# Patient Record
Sex: MALE | Race: BLACK OR AFRICAN AMERICAN | Employment: STUDENT | URBAN - METROPOLITAN AREA
[De-identification: names, ages, dates, MRNs, and addresses within clinical notes are randomized per-mention and may not be internally consistent; named-entity substitution may affect disease eponyms.]

---

## 2017-07-12 ENCOUNTER — OFFICE VISIT (OUTPATIENT)
Dept: FAMILY MEDICINE CLINIC | Age: 22
End: 2017-07-12

## 2017-07-12 VITALS
DIASTOLIC BLOOD PRESSURE: 72 MMHG | HEIGHT: 73 IN | OXYGEN SATURATION: 95 % | RESPIRATION RATE: 16 BRPM | WEIGHT: 180.8 LBS | TEMPERATURE: 97.5 F | BODY MASS INDEX: 23.96 KG/M2 | HEART RATE: 71 BPM | SYSTOLIC BLOOD PRESSURE: 128 MMHG

## 2017-07-12 DIAGNOSIS — Z02.0 SCHOOL PHYSICAL EXAM: Primary | ICD-10-CM

## 2017-07-12 DIAGNOSIS — R56.9 SEIZURE (HCC): ICD-10-CM

## 2017-07-12 LAB
BILIRUB UR QL STRIP: NEGATIVE
GLUCOSE UR-MCNC: NEGATIVE MG/DL
KETONES P FAST UR STRIP-MCNC: NEGATIVE MG/DL
PH UR STRIP: 8.5 [PH] (ref 4.6–8)
PROT UR QL STRIP: ABNORMAL MG/DL
SP GR UR STRIP: 1.01 (ref 1–1.03)
UA UROBILINOGEN AMB POC: ABNORMAL (ref 0.2–1)
URINALYSIS CLARITY POC: CLEAR
URINALYSIS COLOR POC: YELLOW
URINE BLOOD POC: NEGATIVE
URINE LEUKOCYTES POC: NEGATIVE
URINE NITRITES POC: NEGATIVE

## 2017-07-12 RX ORDER — DIVALPROEX SODIUM 500 MG/1
500 TABLET, EXTENDED RELEASE ORAL 2 TIMES DAILY
COMMUNITY
Start: 2017-06-05 | End: 2021-11-12 | Stop reason: SDUPTHER

## 2017-07-12 NOTE — MR AVS SNAPSHOT
Visit Information Date & Time Provider Department Dept. Phone Encounter #  
 7/12/2017 11:15 AM Nba Montoya MD [address] [phone] [id number] Follow-up Instructions Return in about 1 year (around 7/12/2018). Your Appointments 7/24/2017 11:45 AM  
SCHOOL/SPORTS PHYSICAL with Nba Montoya MD  
[address]) Appt Note: College Physical per Dr. Rod Kurtz [address] [facility] 7 [address] [phone] [address] Upcoming Health Maintenance Date Due  
 HPV AGE 9Y-34Y (1 of 3 - Male 3 Dose Series) 12/6/2008 INFLUENZA AGE 9 TO ADULT 8/1/2017 DTaP/Tdap/Td series (8 - Td) 7/11/2026 Allergies as of 7/12/2017  Review Complete On: 7/11/2016 By: Gareth Cleary MD  
 No Known Allergies Current Immunizations  Reviewed on 7/12/2016 Name Date DTAP Vaccine 12/28/2001, 6/28/1999, 6/12/1998, 3/20/1998, 1/20/1998 HIB Vaccine 3/5/1999, 6/12/1998, 3/20/1998, 1/20/1998 Hepatitis A Vaccine 8/21/2007, 1/15/2007 Hepatitis B Vaccine 9/25/1998, 3/22/1998, 1/20/1998 IPV 6/28/1999, 3/20/1998, 1/20/1998 MMR Vaccine 12/8/2002, 3/15/1999 Meningococcal (MCV4O) Vaccine 7/11/2016 Meningococcal Vaccine 4/21/2011, 7/24/2009 Poliovirus vaccine 12/28/2001 TDAP Vaccine 7/8/2008 Td, Adsorbed PF 7/11/2016 Varicella Virus Vaccine Live 7/24/2009, 12/28/1998 Not reviewed this visit You Were Diagnosed With   
  
 Codes Comments School physical exam    -  Primary ICD-10-CM: Z02.0 ICD-9-CM: V70.5 Vitals BP Pulse Temp Resp Height(growth percentile) 128/72 (60 %/ 27 %)* (BP 1 Location: Left arm, BP Patient Position: Sitting) 71 97.5 °F (36.4 °C) (Oral) 16 6' 1\" (1.854 m) (89 %, Z= 1.22) Weight(growth percentile) SpO2 BMI Smoking Status 180 lb 12.8 oz (82 kg) (82 %, Z= 0.91) 95% 23.85 kg/m2 (63 %, Z= 0.32) Never Smoker *BP percentiles are based on NHBPEP's 4th Report Growth percentiles are based on CDC 2-20 Years data. Vitals History BMI and BSA Data Body Mass Index Body Surface Area  
 23.85 kg/m 2 2.06 m 2 Preferred Pharmacy Pharmacy Name Phone [facility] [address], [address] [place] [address] Avenue [phone] Your Updated Medication List  
  
   
This list is accurate as of: 7/12/17 12:15 PM.  Always use your most recent med list.  
  
  
  
  
 * DEPAKOTE 500 mg tablet Generic drug:  divalproex DR Take 500 mg by mouth two (2) times a day. * divalproex  mg ER tablet Commonly known as:  DEPAKOTE ER Take 500 mg by mouth two (2) times a day. multivitamin tablet Commonly known as:  ONE A DAY Take 1 Tab by mouth daily. * Notice: This list has 2 medication(s) that are the same as other medications prescribed for you. Read the directions carefully, and ask your doctor or other care provider to review them with you. We Performed the Following AMB POC URINALYSIS DIP STICK AUTO W/ MICRO [61956 CPT(R)] CBC W/O DIFF [00443 CPT(R)] LIPID PANEL [69309 CPT(R)] Follow-up Instructions Return in about 1 year (around 7/12/2018). Introducing [facility]! Dear Andrew Isaac: Thank you for requesting a LiquidPlanner account. Our records indicate that you have previously registered for a LiquidPlanner account but its currently inactive. Please call our LiquidPlanner support line at [phone]. Additional Information If you have questions, please visit the Frequently Asked Questions section of the LiquidPlanner website at [email or web address] Remember, LiquidPlanner is NOT to be used for urgent needs. For medical emergencies, dial 911. Now available from your iPhone and Android! Please provide this summary of care documentation to your next provider. Your primary care clinician is listed as Kikr Garay. If you have any questions after today's visit, please call [phone].

## 2017-07-12 NOTE — PROGRESS NOTES
Subjective:   Tenzin Hardy is a 25 y.o. male presenting for his annual checkup. ROS:  Feeling well. No dyspnea or chest pain on exertion. No abdominal pain, change in bowel habits, black or bloody stools. No urinary tract or prostatic symptoms. No neurological complaints. Denies any recent seizure activity. Patient Active Problem List   Diagnosis Code    Seizure ([facility] R56.9       Current Outpatient Prescriptions   Medication Sig Dispense Refill    divalproex ER (DEPAKOTE ER) 500 mg ER tablet Take 500 mg by mouth two (2) times a day.  multivitamin (ONE A DAY) tablet Take 1 Tab by mouth daily. No Known Allergies    History reviewed. No pertinent past medical history. History reviewed. No pertinent surgical history. Family History   Problem Relation Age of Onset    Hypertension Maternal Grandmother     Hypertension Paternal Grandmother     Cancer Paternal Grandfather     Headache Mother     Hypertension Father     Cancer Father     No Known Problems Sister        Social History   Substance Use Topics    Smoking status: Never Smoker    Smokeless tobacco: Never Used    Alcohol use No            Objective:     Visit Vitals    /72 (BP 1 Location: Left arm, BP Patient Position: Sitting)    Pulse 71    Temp 97.5 °F (36.4 °C) (Oral)    Resp 16    Ht 6' 1\" (1.854 m)    Wt 180 lb 12.8 oz (82 kg)    SpO2 95%    BMI 23.85 kg/m2     The patient appears well, alert, oriented x 3, in no distress. ENT normal.  Neck supple. No adenopathy or thyromegaly. MANNY. Lungs are clear, good air entry, no wheezes, rhonchi or rales. S1 and S2 normal, no murmurs, regular rate and rhythm. Abdomen is soft without tenderness, guarding, mass or organomegaly.  exam: no penile lesions or discharge, no testicular masses or tenderness, no hernias. Extremities show no edema, normal peripheral pulses. Neurological is normal without focal findings.     Assessment/Plan:   healthy adult male  continue present diet with no restrictions, routine labs ordered, anticipatory guidance for age. Christa Ortiz was seen today for complete physical.    Diagnoses and all orders for this visit:    School physical exam  -     AMB POC URINALYSIS DIP STICK AUTO W/ MICRO  -     CBC W/O DIFF  -     LIPID PANEL  Form to be completed for his college. Seizure ([address]  Stable. Follow up with neuro. Follow-up Disposition:  Return in about 1 year (around 7/12/2018). reviewed diet, exercise and weight control  cardiovascular risk and specific lipid/LDL goals reviewed.

## 2017-07-13 LAB
CHOLEST SERPL-MCNC: 163 MG/DL (ref 100–169)
ERYTHROCYTE [DISTWIDTH] IN BLOOD BY AUTOMATED COUNT: 15.2 % (ref 12.3–15.4)
HCT VFR BLD AUTO: 49.2 % (ref 37.5–51)
HDLC SERPL-MCNC: 33 MG/DL
HGB BLD-MCNC: 16 G/DL (ref 12.6–17.7)
INTERPRETATION, 910389: NORMAL
LDLC SERPL CALC-MCNC: 104 MG/DL (ref 0–109)
MCH RBC QN AUTO: 26.1 PG (ref 26.6–33)
MCHC RBC AUTO-ENTMCNC: 32.5 G/DL (ref 31.5–35.7)
MCV RBC AUTO: 80 FL (ref 79–97)
PLATELET # BLD AUTO: 248 X10E3/UL (ref 150–379)
RBC # BLD AUTO: 6.13 X10E6/UL (ref 4.14–5.8)
TRIGL SERPL-MCNC: 129 MG/DL (ref 0–89)
VLDLC SERPL CALC-MCNC: 26 MG/DL (ref 5–40)
WBC # BLD AUTO: 8.1 X10E3/UL (ref 3.4–10.8)

## 2017-07-19 ENCOUNTER — TELEPHONE (OUTPATIENT)
Dept: FAMILY MEDICINE CLINIC | Age: 22
End: 2017-07-19

## 2017-07-19 NOTE — TELEPHONE ENCOUNTER
----- Message from Jasmine Mendez MD sent at 7/12/2017  1:25 PM EDT -----  Please call pt and find out when he was last seen by his neurology for his seizures. See if he has upcoming appt.

## 2017-07-19 NOTE — TELEPHONE ENCOUNTER
Spoke to pt's mother to find out when pt last saw neurologist. Pt's mother stated  pt saw his neurologist 6/29/2017 and has another appt scheduled for 12/2017. Dr. Kevin Cunningham made aware.

## 2019-02-26 ENCOUNTER — TELEPHONE (OUTPATIENT)
Dept: FAMILY MEDICINE CLINIC | Age: 24
End: 2019-02-26

## 2019-02-26 NOTE — TELEPHONE ENCOUNTER
----- Message from Tim Hammonds sent at 2/26/2019  7:34 AM EST -----  Regarding: Dr. Dominique uLcas  Pt requested an appt for a f/up for a uti and requested an appt for Friday, 03/08/19. Best contact number [phone].

## 2019-03-08 ENCOUNTER — OFFICE VISIT (OUTPATIENT)
Dept: FAMILY MEDICINE CLINIC | Age: 24
End: 2019-03-08

## 2019-03-08 VITALS
BODY MASS INDEX: 24.92 KG/M2 | TEMPERATURE: 96.7 F | DIASTOLIC BLOOD PRESSURE: 68 MMHG | OXYGEN SATURATION: 100 % | HEART RATE: 76 BPM | SYSTOLIC BLOOD PRESSURE: 135 MMHG | RESPIRATION RATE: 16 BRPM | HEIGHT: 73 IN | WEIGHT: 188 LBS

## 2019-03-08 DIAGNOSIS — R30.9 URINARY PAIN: Primary | ICD-10-CM

## 2019-03-08 DIAGNOSIS — Z11.3 SCREEN FOR STD (SEXUALLY TRANSMITTED DISEASE): ICD-10-CM

## 2019-03-08 DIAGNOSIS — Z11.4 ENCOUNTER FOR SCREENING FOR HIV: ICD-10-CM

## 2019-03-08 DIAGNOSIS — R36.9 PENILE DISCHARGE: ICD-10-CM

## 2019-03-08 LAB
BACTERIA UA POCT, BACTPOCT: NORMAL
BILIRUB UR QL STRIP: NEGATIVE
CASTS UA POCT: NORMAL
CLUE CELLS, CLUEPOCT: NORMAL
CRYSTALS UA POCT, CRYSPOCT: NORMAL
EPITHELIAL CELLS POCT: NORMAL
GLUCOSE UR-MCNC: NEGATIVE MG/DL
KETONES P FAST UR STRIP-MCNC: NEGATIVE MG/DL
MUCUS UA POCT, MUCPOCT: NORMAL
PH UR STRIP: 7.5 [PH] (ref 4.6–8)
PROT UR QL STRIP: NEGATIVE
RBC UA POCT, RBCPOCT: NORMAL
SP GR UR STRIP: 1.02 (ref 1–1.03)
TRICH UA POCT, TRICHPOC: NORMAL
UA UROBILINOGEN AMB POC: NORMAL (ref 0.2–1)
URINALYSIS CLARITY POC: CLEAR
URINALYSIS COLOR POC: YELLOW
URINE BLOOD POC: NORMAL
URINE CULT COMMENT, POCT: NORMAL
URINE LEUKOCYTES POC: NORMAL
URINE NITRITES POC: NEGATIVE
WBC UA POCT, WBCPOCT: NORMAL
YEAST UA POCT, YEASTPOC: NORMAL

## 2019-03-08 NOTE — PROGRESS NOTES
HISTORY OF PRESENT ILLNESS  Sandra Velásquez is a 26 y.o. male. HPI  Patient comes in today for UTI  Went to [facility] 2-3 weeks AGO, dx with UTI. Given Cipro. Had a urine culture which patient states was negative. Did not have STD screening. Has had unprotected sex. Some dysuria and burning. Has penile discharge, mostly clear. Some pressure in lower abdomen. Occasionally will have some pain in right flank. No fever, chills. Drinks a lot of water and cranberry juice. No Known Allergies    History reviewed. No pertinent past medical history. History reviewed. No pertinent surgical history. Social History     Socioeconomic History    Marital status: SINGLE     Spouse name: Not on file    Number of children: Not on file    Years of education: Not on file    Highest education level: Not on file   Social Needs    Financial resource strain: Not on file    Food insecurity - worry: Not on file    Food insecurity - inability: Not on file    Transportation needs - medical: Not on file   Gopeers needs - non-medical: Not on file   Occupational History    Not on file   Tobacco Use    Smoking status: Never Smoker    Smokeless tobacco: Never Used   Substance and Sexual Activity    Alcohol use: No    Drug use: No    Sexual activity: Yes     Partners: Female     Birth control/protection: Condom   Other Topics Concern    Not on file   Social History Narrative    Not on file       Family History   Problem Relation Age of Onset    Hypertension Maternal Grandmother     Hypertension Paternal Grandmother     Cancer Paternal Grandfather     Headache Mother     Hypertension Father     Cancer Father     No Known Problems Sister        Current Outpatient Medications   Medication Sig    divalproex ER (DEPAKOTE ER) 500 mg ER tablet Take 500 mg by mouth two (2) times a day.  multivitamin (ONE A DAY) tablet Take 1 Tab by mouth daily.      No current facility-administered medications for this visit. Review of Systems   Constitutional: Negative for chills and fever. Gastrointestinal: Positive for abdominal pain (PRESSURE). Genitourinary: Positive for dysuria and flank pain. Negative for frequency, hematuria and urgency. Penile discharge     Vitals:    03/08/19 1057   BP: 135/68   Pulse: 76   Resp: 16   Temp: 96.7 °F (35.9 °C)   TempSrc: Oral   SpO2: 100%   Weight: 188 lb (85.3 kg)   Height: 6' 1\" (1.854 m)     Physical Exam   Constitutional: He is oriented to person, place, and time. He appears well-developed and well-nourished. Cardiovascular: Normal rate. Pulmonary/Chest: Effort normal.   Abdominal: Soft. Normal appearance and bowel sounds are normal. There is no tenderness. There is no CVA tenderness. Genitourinary:   Genitourinary Comments:  exam refused by patient   Neurological: He is alert and oriented to person, place, and time. Skin: Skin is warm and dry. ASSESSMENT and PLAN    ICD-10-CM ICD-9-CM    1. Urinary pain R30.9 788.1 AMB POC URINALYSIS DIP STICK AUTO W/ MICRO       CBC WITH AUTOMATED DIFF      METABOLIC PANEL, COMPREHENSIVE      CULTURE, URINE   2. Penile discharge R36.9 788.7    3. Screen for STD (sexually transmitted disease) Z11.3 V74.5 CHLAMYDIA/GC PCR   4. Encounter for screening for HIV Z11.4 V73.89 HIV 1/2 AG/AB, [address] CONFIRM     Encounter Diagnoses   Name Primary?  Urinary pain Yes    Penile discharge     Screen for STD (sexually transmitted disease)     Encounter for screening for HIV      Orders Placed This Encounter    CHLAMYDIA/GC PCR    CULTURE, URINE    CBC WITH AUTOMATED DIFF    METABOLIC PANEL, COMPREHENSIVE    HIV 1/2 AG/AB, 4TH GENERATION,W RFLX CONFIRM    AMB POC URINALYSIS DIP STICK AUTO W/ MICRO      Diagnoses and all orders for this visit:    1. Urinary pain - UA neg nitrites, 1+ LE. Micro shows 10-20 WBCs/hpf, 1+ bacteria. Will check GC PCR. Had a previous negative urine culture.   If urine cx neg and STD screen negative, will treat for trich with Flagyl and repeat UA 2-3 weeks after treatment. If continues to have WBCs in urine, will send to urology  -     AMB POC URINALYSIS DIP STICK AUTO W/ MICRO   -     CBC WITH AUTOMATED DIFF  -     METABOLIC PANEL, COMPREHENSIVE  -     CULTURE, URINE    2. Penile discharge    3. Screen for STD (sexually transmitted disease)  -     CHLAMYDIA/GC PCR    4. Encounter for screening for HIV  -     HIV 1/2 AG/AB, 4TH GENERATION,W RFLX CONFIRM      Follow-up Disposition:  Return if symptoms worsen or fail to improve.  lab results and schedule of future lab studies reviewed with patient    I have reviewed the patient's allergies and made any necessary changes. Medical, procedural, social and family histories have been reviewed and updated as medically indicated. I have reconciled and/or revised patient medications in the EMR. I have discussed each diagnosis listed in this note with Jai Cisneros and/or their family. I have discussed treatment options and the risk/benefit analysis of those options, including safe use of medications and possible medication side effects. Through the use of shared decision making we have agreed to the above plan. The patient has received an after-visit summary and questions were answered concerning future plans. Jia Cr, NAVIN-C    This note will not be viewable in [employer].

## 2019-03-08 NOTE — PATIENT INSTRUCTIONS

## 2019-03-08 NOTE — PROGRESS NOTES
Chief Complaint   Patient presents with    Bladder Infection     Pt states 2-3 weeks ago had UTI symptoms was given medication and symptoms have returned. Pt states urinary burning and frequency. Pt states sees some clear discharge. 1. Have you been to the ER, urgent care clinic since your last visit? Hospitalized since your last visit? No    2. Have you seen or consulted any other health care providers outside of the [address] [place] [address] since your last visit? Include any pap smears or colon screening.  No    Health Maintenance Due   Topic Date Due    HPV Age 9Y-34Y (2 - Male 3-dose series) 09/06/2017    Influenza Age 5 to Adult  08/01/2018

## 2019-03-09 LAB
ALBUMIN SERPL-MCNC: 4.8 G/DL (ref 3.5–5.5)
ALBUMIN/GLOB SERPL: 1.5 {RATIO} (ref 1.2–2.2)
ALP SERPL-CCNC: 113 IU/L (ref 39–117)
ALT SERPL-CCNC: 23 IU/L (ref 0–44)
AST SERPL-CCNC: 18 IU/L (ref 0–40)
BACTERIA UR CULT: NO GROWTH
BASOPHILS # BLD AUTO: 0 X10E3/UL (ref 0–0.2)
BASOPHILS NFR BLD AUTO: 1 %
BILIRUB SERPL-MCNC: 0.3 MG/DL (ref 0–1.2)
BUN SERPL-MCNC: 7 MG/DL (ref 6–20)
BUN/CREAT SERPL: 7 (ref 9–20)
CALCIUM SERPL-MCNC: 10.1 MG/DL (ref 8.7–10.2)
CHLORIDE SERPL-SCNC: 104 MMOL/L (ref 96–106)
CO2 SERPL-SCNC: 26 MMOL/L (ref 20–29)
CREAT SERPL-MCNC: 0.98 MG/DL (ref 0.76–1.27)
EOSINOPHIL # BLD AUTO: 0.1 X10E3/UL (ref 0–0.4)
EOSINOPHIL NFR BLD AUTO: 2 %
ERYTHROCYTE [DISTWIDTH] IN BLOOD BY AUTOMATED COUNT: 14.5 % (ref 12.3–15.4)
GLOBULIN SER CALC-MCNC: 3.1 G/DL (ref 1.5–4.5)
GLUCOSE SERPL-MCNC: 105 MG/DL (ref 65–99)
HCT VFR BLD AUTO: 40.9 % (ref 37.5–51)
HGB BLD-MCNC: 13.4 G/DL (ref 13–17.7)
HIV 1+2 AB+HIV1 P24 AG SERPL QL IA: NON REACTIVE
IMM GRANULOCYTES # BLD AUTO: 0 X10E3/UL (ref 0–0.1)
IMM GRANULOCYTES NFR BLD AUTO: 0 %
LYMPHOCYTES # BLD AUTO: 1.3 X10E3/UL (ref 0.7–3.1)
LYMPHOCYTES NFR BLD AUTO: 25 %
MCH RBC QN AUTO: 26 PG (ref 26.6–33)
MCHC RBC AUTO-ENTMCNC: 32.8 G/DL (ref 31.5–35.7)
MCV RBC AUTO: 79 FL (ref 79–97)
MONOCYTES # BLD AUTO: 0.6 X10E3/UL (ref 0.1–0.9)
MONOCYTES NFR BLD AUTO: 11 %
NEUTROPHILS # BLD AUTO: 3.3 X10E3/UL (ref 1.4–7)
NEUTROPHILS NFR BLD AUTO: 61 %
PLATELET # BLD AUTO: 306 X10E3/UL (ref 150–379)
POTASSIUM SERPL-SCNC: 4.3 MMOL/L (ref 3.5–5.2)
PROT SERPL-MCNC: 7.9 G/DL (ref 6–8.5)
RBC # BLD AUTO: 5.15 X10E6/UL (ref 4.14–5.8)
SODIUM SERPL-SCNC: 143 MMOL/L (ref 134–144)
WBC # BLD AUTO: 5.3 X10E3/UL (ref 3.4–10.8)

## 2019-03-10 LAB
C TRACH RRNA SPEC QL NAA+PROBE: NEGATIVE
N GONORRHOEA RRNA SPEC QL NAA+PROBE: NEGATIVE

## 2019-03-10 RX ORDER — METRONIDAZOLE 500 MG/1
2000 TABLET ORAL ONCE
Qty: 4 TAB | Refills: 0 | Status: SHIPPED | OUTPATIENT
Start: 2019-03-10 | End: 2019-03-10

## 2019-03-11 ENCOUNTER — TELEPHONE (OUTPATIENT)
Dept: FAMILY MEDICINE CLINIC | Age: 24
End: 2019-03-11

## 2019-03-11 NOTE — TELEPHONE ENCOUNTER
Patient called stating that he would like a call back with his lab results. Patient can be reached at [phone].

## 2019-03-12 ENCOUNTER — TELEPHONE (OUTPATIENT)
Dept: FAMILY MEDICINE CLINIC | Age: 24
End: 2019-03-12

## 2019-03-12 NOTE — TELEPHONE ENCOUNTER
----- Message from Casi Goodson sent at 3/12/2019 10:08 AM EDT -----  Regarding: Dr. Lei Friday  Pt is returning the phone call from the practice. Best contact number is [phone].

## 2019-03-12 NOTE — TELEPHONE ENCOUNTER
----- Message from Harsh Katz sent at 3/12/2019 10:04 AM EDT -----  Regarding: BRII Cr/ Telephone  Pt is retuning a call from Rizwana Poon.  Phone: [phone]

## 2019-03-27 ENCOUNTER — LAB ONLY (OUTPATIENT)
Dept: FAMILY MEDICINE CLINIC | Age: 24
End: 2019-03-27

## 2019-03-27 DIAGNOSIS — R36.9 PENILE DISCHARGE: Primary | ICD-10-CM

## 2019-03-27 DIAGNOSIS — R82.81 PYURIA: ICD-10-CM

## 2019-03-27 LAB
BACTERIA UA POCT, BACTPOCT: NORMAL
BILIRUB UR QL STRIP: NEGATIVE
CASTS UA POCT: NORMAL
CLUE CELLS, CLUEPOCT: NORMAL
CRYSTALS UA POCT, CRYSPOCT: NORMAL
EPITHELIAL CELLS POCT: NORMAL
GLUCOSE UR-MCNC: NEGATIVE MG/DL
KETONES P FAST UR STRIP-MCNC: NEGATIVE MG/DL
MUCUS UA POCT, MUCPOCT: NORMAL
PH UR STRIP: 7 [PH] (ref 4.6–8)
PROT UR QL STRIP: NEGATIVE
RBC UA POCT, RBCPOCT: NORMAL
SP GR UR STRIP: 1.02 (ref 1–1.03)
TRICH UA POCT, TRICHPOC: NORMAL
UA UROBILINOGEN AMB POC: NORMAL (ref 0.2–1)
URINALYSIS CLARITY POC: NORMAL
URINALYSIS COLOR POC: YELLOW
URINE BLOOD POC: NORMAL
URINE CULT COMMENT, POCT: NORMAL
URINE LEUKOCYTES POC: NORMAL
URINE NITRITES POC: NEGATIVE
WBC UA POCT, WBCPOCT: NORMAL
YEAST UA POCT, YEASTPOC: NORMAL

## 2019-03-28 LAB — BACTERIA UR CULT: NO GROWTH

## 2019-03-29 DIAGNOSIS — R82.81 PYURIA: Primary | ICD-10-CM

## 2020-09-08 ENCOUNTER — OFFICE VISIT (OUTPATIENT)
Dept: FAMILY MEDICINE CLINIC | Age: 25
End: 2020-09-08
Payer: COMMERCIAL

## 2020-09-08 VITALS
DIASTOLIC BLOOD PRESSURE: 74 MMHG | HEART RATE: 71 BPM | HEIGHT: 73 IN | OXYGEN SATURATION: 98 % | WEIGHT: 185.8 LBS | RESPIRATION RATE: 16 BRPM | SYSTOLIC BLOOD PRESSURE: 117 MMHG | BODY MASS INDEX: 24.63 KG/M2 | TEMPERATURE: 97.3 F

## 2020-09-08 DIAGNOSIS — Z13.220 SCREENING, LIPID: ICD-10-CM

## 2020-09-08 DIAGNOSIS — R10.9 ABDOMINAL CRAMPING: ICD-10-CM

## 2020-09-08 DIAGNOSIS — Z23 ENCOUNTER FOR IMMUNIZATION: ICD-10-CM

## 2020-09-08 DIAGNOSIS — R31.0 GROSS HEMATURIA: ICD-10-CM

## 2020-09-08 DIAGNOSIS — Z11.4 SCREENING FOR HIV WITHOUT PRESENCE OF RISK FACTORS: ICD-10-CM

## 2020-09-08 DIAGNOSIS — Z00.00 ROUTINE GENERAL MEDICAL EXAMINATION AT A HEALTH CARE FACILITY: Primary | ICD-10-CM

## 2020-09-08 DIAGNOSIS — Z11.3 ROUTINE SCREENING FOR STI (SEXUALLY TRANSMITTED INFECTION): ICD-10-CM

## 2020-09-08 DIAGNOSIS — K92.1 BLOOD IN THE STOOL: ICD-10-CM

## 2020-09-08 LAB
BILIRUB UR QL STRIP: NEGATIVE
GLUCOSE UR-MCNC: NEGATIVE MG/DL
HEMOCCULT STL QL: POSITIVE
KETONES P FAST UR STRIP-MCNC: NEGATIVE MG/DL
PH UR STRIP: 8.5 [PH] (ref 4.6–8)
PROT UR QL STRIP: NEGATIVE
SP GR UR STRIP: 1.01 (ref 1–1.03)
UA UROBILINOGEN AMB POC: ABNORMAL (ref 0.2–1)
URINALYSIS CLARITY POC: ABNORMAL
URINALYSIS COLOR POC: YELLOW
URINE BLOOD POC: NEGATIVE
URINE LEUKOCYTES POC: ABNORMAL
URINE NITRITES POC: NEGATIVE
VALID INTERNAL CONTROL?: YES

## 2020-09-08 PROCEDURE — 99000 SPECIMEN HANDLING OFFICE-LAB: CPT | Performed by: FAMILY MEDICINE

## 2020-09-08 PROCEDURE — 81001 URINALYSIS AUTO W/SCOPE: CPT | Performed by: FAMILY MEDICINE

## 2020-09-08 PROCEDURE — 90471 IMMUNIZATION ADMIN: CPT | Performed by: FAMILY MEDICINE

## 2020-09-08 PROCEDURE — 90686 IIV4 VACC NO PRSV 0.5 ML IM: CPT

## 2020-09-08 PROCEDURE — 82272 OCCULT BLD FECES 1-3 TESTS: CPT | Performed by: FAMILY MEDICINE

## 2020-09-08 PROCEDURE — 99385 PREV VISIT NEW AGE 18-39: CPT | Performed by: FAMILY MEDICINE

## 2020-09-08 PROCEDURE — 90471 IMMUNIZATION ADMIN: CPT

## 2020-09-08 NOTE — PROGRESS NOTES
Chief Complaint   Patient presents with    Complete Physical         Verbal order received per Dr. Gleason- flu vaccine IM- VORB    Pt received flu vaccine IM in left deltoid without any difficulty. 1. Have you been to the ER, urgent care clinic since your last visit? Hospitalized since your last visit? no    2. Have you seen or consulted any other health care providers outside of the [address] [place] [address] since your last visit? Include any pap smears or colon screening.  no

## 2020-09-08 NOTE — PATIENT INSTRUCTIONS
Celiac Disease: Care Instructions Your Care Instructions Celiac disease (or celiac sprue) is a problem with digesting gluten. Gluten is a type of protein found in wheat, rye, and other grains. This problem starts when the body's immune system attacks the small intestine when gluten is eaten. The immune system is supposed to fight off viruses and other invaders, but sometimes it turns on the person's own body. (This is called an autoimmune disease.) Celiac disease seems to run in families. Celiac disease causes damage to the small intestine. This makes it hard for the body to absorb vitamins and other nutrients. You cannot prevent celiac disease. But you can stop and reverse the damage to the small intestine by eating a strict gluten-free diet. Follow-up care is a key part of your treatment and safety. Be sure to make and go to all appointments, and call your doctor if you are having problems. It's also a good idea to know your test results and keep a list of the medicines you take. How can you care for yourself at home? · Eat a gluten-free diet to prevent symptoms and damage to the small intestine. Even a small amount of gluten may cause damage. ? Avoid all foods that contain wheat, rye, and barley gluten. Bread, bagels, pasta, pizza, malted breakfast cereals, and crackers are all examples of foods that contain gluten. ? Avoid oats, at least at first. Oats may cause symptoms in some people. The oats may be contaminated with wheat, barley, or rye from processing. But many people who have celiac disease can eat moderate amounts of oats without having symptoms. Health professionals vary in their long-term recommendations regarding eating foods with oats. But most agree it is safe to eat oats labeled as gluten-free. · You may need to avoid milk and milk products for a while. Once you stop eating any gluten, the intestine will begin to heal. Then it should be okay to drink milk and eat milk products. · Read food labels carefully and look for hidden gluten, such as gluten in medicine and some food additives. If a label says \"modified food starch,\" the product may contain gluten. · Plan your diet around: 
? Eggs. ? Dairy products, if you can eat them. Cheese, yogurt, and other dairy products can be an important part of the diet. ? Flours and foods made with amaranth, arrowroot, beans, buckwheat, corn, cornmeal, flax, millet, potatoes, gluten-free nut and oat bran, quinoa, rice, sorghum, soybeans, tapioca, or teff. ? Fresh, frozen, and canned meats, fruits, and vegetables. Watch for added gluten. · Talk to your doctor or contact your local hospital or dietitian for information about support groups in your area. You may find a support group helpful for discovering ways to help you deal with celiac disease. Celiac disease support groups often share recipes and good food sources. · Look for gluten-free foods. Many food stores, especially health food stores, offer specially marked gluten-free food. When should you call for help? Watch closely for changes in your health, and be sure to contact your doctor if: 
  · Your bloating, gas, and diarrhea get worse.  
  · You have bloating, gas, and diarrhea after not having them for a while. Where can you learn more? Go to http://josefina-leela.info/ Enter [id number] in the search box to learn more about \"Celiac Disease: Care Instructions. \" Current as of: April 15, 2020               Content Version: 12.6 © 0648-3242 [employer]. Care instructions adapted under license by [employer] (which disclaims liability or warranty for this information). If you have questions about a medical condition or this instruction, always ask your healthcare professional. [address] any warranty or liability for your use of this information. Gluten-Free Diet: Care Instructions Your Care Instructions To help your symptoms, your doctor has recommended a gluten-free diet. This means not eating foods that have gluten in them. Gluten is a kind of protein. It's found in wheat, barley, and rye. If you eat a gluten-free diet, you can help manage your symptoms and prevent long-term problems. You can also get all the nutrition you need. Follow-up care is a key part of your treatment and safety. Be sure to make and go to all appointments, and call your doctor if you are having problems. It's also a good idea to know your test results and keep a list of the medicines you take. How can you care for yourself at home? · Don't eat any foods that have gluten in them. These include bagels, bread, crackers, and some cereals. They also include pasta and pizza. · Carefully read food labels. Look for wheat or wheat products in ice cream and candy. You may also find them in salad dressing, canned and frozen soups and vegetables, and other processed foods. · Avoid all beer products unless the label says they are gluten-free. Beers with and without alcohol have gluten unless the labels say they are gluten-free. This includes lagers, ales, and stouts. · Avoid oats, at least at first. Oats may cause symptoms in some people, perhaps as a result of contamination with wheat, barley, or rye during processing. But many people who have celiac disease can eat moderate amounts of oats without having symptoms. Health professionals vary in their long-term recommendations regarding eating foods with oats. But most agree it is safe to eat oats labeled as gluten-free. · When you eat out, look for restaurants that serve gluten-free food. You can also ask if the  is familiar with gluten-free cooking. · Try to learn more about gluten-free options. Find grocery stores that sell gluten-free pizza and other foods. If you have access to the Internet, look online for gluten-free foods and recipes. · On a gluten-free eating plan, it's okay to have: 
? Eggs and dairy products. (But some dairy products may make your symptoms worse. Ask your doctor if you have questions about dairy products. Read ingredient labels carefully. Some processed cheeses contain gluten.) ? Flours and foods made with amaranth, arrowroot, beans, buckwheat, corn, cornmeal, flax, millet, potatoes, gluten-free nut and oat bran, quinoa, rice, sorghum, soybeans, tapioca, or teff. ? Fresh, frozen, or canned unprocessed meats. But avoid processed meats. Some examples of processed meats to avoid are hot dogs, salami, and deli meat. Read labels for additives that may contain gluten. ? Fresh, frozen, dried, or canned fruits and vegetables, if they do not have thickeners or other additives that contain gluten. ? Some alcohol drinks. These include wine, liqueurs, and ciders. They also include liquor like whiskey and quirino. When should you call for help? Watch closely for changes in your health, and be sure to contact your doctor if: 
  · You have unexplained weight loss.  
  · You have diarrhea that lasts longer than 1 to 2 weeks.  
  · You have unusual fatigue or mood changes, especially if these last more than a week and are not related to any other illness, such as the flu.  
  · Your symptoms come back again.  
  · Your stomach pain gets worse. Where can you learn more? Go to http://josefina-leela.info/ Enter [phone] in the search box to learn more about \"Gluten-Free Diet: Care Instructions. \" Current as of: August 22, 2019               Content Version: 12.6 © 1246-7627 [employer]. Care instructions adapted under license by [employer] (which disclaims liability or warranty for this information).  If you have questions about a medical condition or this instruction, always ask your healthcare professional. [employer] disclaims any warranty or liability for your use of this information.

## 2020-09-08 NOTE — PROGRESS NOTES
Subjective:   Kacey Garcia is a 27 y.o. male presenting for his annual checkup. Has hx of seizure disorder since age 17. He has been stable on Depakote. No seizures since his initial diagnosis. He is followed at [facility] by Dr. María Vizcarra. They have discussed him coming off of the seizure medication but no decision has been made. ROS:  Feeling well. No dyspnea or chest pain on exertion. Specific concerns today: has \"stomach issues\". Has bout of having stomach pain and diarrhea. Had EGD in 2016. Was told to take omeprazole but has had still had sx off and on. Has has  blood in with his stool. Has stomach cramping. No nausea or vomiting. Has upper abd pain that comes and goes when he gets the cramping. Sx may last for a few hours and then gradually subsides. Also has had blood in the urine off and on over the last year. Treated last year for STD. Seen later by urology and told related to STD. Had UTI again on last month and was treated at [facility] for UTI. Was treated with bactrim. Also had blood noted in the urine. Was tested at [facility] for STD on last month and was negative. Patient Active Problem List   Diagnosis Code    Seizure ([facility] [address] R56.9    Seizures ([facility] [address] R56.9       Current Outpatient Medications   Medication Sig Dispense Refill    divalproex ER (DEPAKOTE ER) 500 mg ER tablet Take 500 mg by mouth two (2) times a day.  multivitamin (ONE A DAY) tablet Take 1 Tab by mouth daily. No Known Allergies    Past Medical History:   Diagnosis Date    Seizures ([address]        History reviewed. No pertinent surgical history.     Family History   Problem Relation Age of Onset    Hypertension Maternal Grandmother     Hypertension Paternal Grandmother     Cancer Paternal Grandfather         colon cancer    Headache Mother     Hypertension Father     Cancer Father         liver and colon cancer in his 46s    Thyroid Disease Sister     Cancer Paternal Uncle pancreatic       Social History     Tobacco Use    Smoking status: Never Smoker    Smokeless tobacco: Never Used   Substance Use Topics    Alcohol use: No          Objective:     Visit Vitals  /74 (BP 1 Location: Left arm, BP Patient Position: Sitting)   Pulse 71   Temp 97.3 °F (36.3 °C) (Temporal)   Resp 16   Ht 6' 1\" (1.854 m)   Wt 185 lb 12.8 oz (84.3 kg)   SpO2 98%   BMI 24.51 kg/m²     The patient appears well, alert, oriented x 3, in no distress. ENT normal.  Neck supple. No adenopathy or thyromegaly. MANNY. Lungs are clear, good air entry, no wheezes, rhonchi or rales. S1 and S2 normal, no murmurs, regular rate and rhythm. Abdomen is soft without tenderness, guarding, mass or organomegaly.  exam: no penile lesions or discharge, no testicular masses or tenderness, no hernias, rectum normal, no masses, prostate normal size, symmetric, no nodules or tenderness, RECTAL EXAM: stool guaiac trace positive. Extremities show no edema, normal peripheral pulses. Neurological is normal without focal findings. Assessment/Plan:   healthy adult male  lose weight, follow low fat diet, follow low salt diet, routine labs ordered, gluten free diet to start until seeing GI again. Diagnoses and all orders for this visit:    1. Routine general medical examination at a health care facility  -     CBC W/O DIFF  -     METABOLIC PANEL, COMPREHENSIVE    2.  Screening, lipid  -     LIPID PANEL    3. Gross hematuria  -     AMB POC URINALYSIS DIP STICK AUTO W/ MICRO  -     CULTURE, URINE  -     REFERRAL TO UROLOGY    4. Abdominal cramping  -     TSH 3RD GENERATION  -     CELIAC ANTIBODY PROFILE  -     REFERRAL TO GASTROENTEROLOGY    5. Blood in the stool  -     REFERRAL TO GASTROENTEROLOGY    6. Routine screening for STI (sexually transmitted infection)  -     CHLAMYDIA/GC PCR    7. Screening for HIV without presence of risk factors  -     HIV 1/2 AG/AB, 4TH GENERATION,W RFLX CONFIRM        reviewed diet, exercise and weight control  cardiovascular risk and specific lipid/LDL goals reviewed  reviewed medications and side effects in detail. I have discussed diagnosis listed in this note with pt and/or family. I have discussed treatment plans and options and the risk/benefit analysis of those options, including safe use of medications and possible medication side effects. Through the use of shared decision making we have agreed to the above plan. The patient has received an after-visit summary and questions were answered concerning future plans and follow up. Advise pt of any urgent changes then to proceed to the ER.

## 2020-09-09 ENCOUNTER — DOCUMENTATION ONLY (OUTPATIENT)
Dept: FAMILY MEDICINE CLINIC | Age: 25
End: 2020-09-09

## 2020-09-09 LAB — HIV 1+2 AB+HIV1 P24 AG SERPL QL IA: NON REACTIVE

## 2020-09-10 LAB
CHOLEST SERPL-MCNC: 187 MG/DL (ref 100–199)
HDLC SERPL-MCNC: 37 MG/DL
INTERPRETATION, 910389: NORMAL
LDLC SERPL CALC-MCNC: 125 MG/DL (ref 0–99)
SPECIMEN STATUS REPORT, ROLRST: NORMAL
TRIGL SERPL-MCNC: 139 MG/DL (ref 0–149)
VLDLC SERPL CALC-MCNC: 25 MG/DL (ref 5–40)

## 2020-09-11 LAB
ALBUMIN SERPL-MCNC: 5 G/DL (ref 4.1–5.2)
ALBUMIN/GLOB SERPL: 1.8 {RATIO} (ref 1.2–2.2)
ALP SERPL-CCNC: 89 IU/L (ref 39–117)
ALT SERPL-CCNC: 30 IU/L (ref 0–44)
AST SERPL-CCNC: 24 IU/L (ref 0–40)
BACTERIA UR CULT: NO GROWTH
BILIRUB SERPL-MCNC: 0.6 MG/DL (ref 0–1.2)
BUN SERPL-MCNC: 9 MG/DL (ref 6–20)
BUN/CREAT SERPL: 9 (ref 9–20)
C TRACH RRNA SPEC QL NAA+PROBE: NORMAL
CALCIUM SERPL-MCNC: 10.2 MG/DL (ref 8.7–10.2)
CHLORIDE SERPL-SCNC: 101 MMOL/L (ref 96–106)
CO2 SERPL-SCNC: 24 MMOL/L (ref 20–29)
CREAT SERPL-MCNC: 1.02 MG/DL (ref 0.76–1.27)
GLIADIN PEPTIDE IGA SER-ACNC: 6 UNITS (ref 0–19)
GLIADIN PEPTIDE IGG SER-ACNC: 6 UNITS (ref 0–19)
GLOBULIN SER CALC-MCNC: 2.8 G/DL (ref 1.5–4.5)
GLUCOSE SERPL-MCNC: 89 MG/DL (ref 65–99)
IGA SERPL-MCNC: 206 MG/DL (ref 90–386)
N GONORRHOEA RRNA SPEC QL NAA+PROBE: NORMAL
POTASSIUM SERPL-SCNC: 4.3 MMOL/L (ref 3.5–5.2)
PROT SERPL-MCNC: 7.8 G/DL (ref 6–8.5)
SODIUM SERPL-SCNC: 140 MMOL/L (ref 134–144)
TSH SERPL DL<=0.005 MIU/L-ACNC: 0.77 UIU/ML (ref 0.45–4.5)
TTG IGA SER-ACNC: <2 U/ML (ref 0–3)
TTG IGG SER-ACNC: 7 U/ML (ref 0–5)

## 2020-09-22 ENCOUNTER — TELEPHONE (OUTPATIENT)
Dept: FAMILY MEDICINE CLINIC | Age: 25
End: 2020-09-22

## 2020-09-22 NOTE — TELEPHONE ENCOUNTER
Patient would like to get a copy of his immunization record he will stop by to pick it up his call back number is [phone]

## 2020-12-08 ENCOUNTER — OFFICE VISIT (OUTPATIENT)
Dept: FAMILY MEDICINE CLINIC | Age: 25
End: 2020-12-08
Payer: COMMERCIAL

## 2020-12-08 VITALS
HEART RATE: 79 BPM | HEIGHT: 73 IN | SYSTOLIC BLOOD PRESSURE: 118 MMHG | DIASTOLIC BLOOD PRESSURE: 78 MMHG | BODY MASS INDEX: 25.76 KG/M2 | RESPIRATION RATE: 12 BRPM | OXYGEN SATURATION: 99 % | TEMPERATURE: 97.5 F | WEIGHT: 194.4 LBS

## 2020-12-08 DIAGNOSIS — Z86.69 HISTORY OF SEIZURE DISORDER: Primary | ICD-10-CM

## 2020-12-08 DIAGNOSIS — Z11.1 SCREENING-PULMONARY TB: ICD-10-CM

## 2020-12-08 DIAGNOSIS — Z87.898 HISTORY OF GROSS HEMATURIA: ICD-10-CM

## 2020-12-08 LAB
BILIRUB UR QL STRIP: NORMAL
GLUCOSE UR-MCNC: NEGATIVE MG/DL
KETONES P FAST UR STRIP-MCNC: NORMAL MG/DL
PH UR STRIP: 5.5 [PH] (ref 4.6–8)
PROT UR QL STRIP: NEGATIVE
SP GR UR STRIP: 1.02 (ref 1–1.03)
UA UROBILINOGEN AMB POC: NORMAL (ref 0.2–1)
URINALYSIS CLARITY POC: CLEAR
URINALYSIS COLOR POC: YELLOW
URINE BLOOD POC: NEGATIVE
URINE LEUKOCYTES POC: NEGATIVE
URINE NITRITES POC: NEGATIVE

## 2020-12-08 PROCEDURE — 81001 URINALYSIS AUTO W/SCOPE: CPT | Performed by: FAMILY MEDICINE

## 2020-12-08 PROCEDURE — 99213 OFFICE O/P EST LOW 20 MIN: CPT | Performed by: FAMILY MEDICINE

## 2020-12-08 NOTE — PROGRESS NOTES
Chief Complaint   Patient presents with    Blood in Urine    Anal Bleeding       1. Have you been to the ER, urgent care clinic since your last visit? Hospitalized since your last visit? No    2. Have you seen or consulted any other health care providers outside of the [address] [place] [address] since your last visit? Include any pap smears or colon screening. Yes, GI for colonoscopy     There are no preventive care reminders to display for this patient.

## 2020-12-08 NOTE — PROGRESS NOTES
HISTORY OF PRESENT ILLNESS  Gala Pittman is a 23 y.o. male. HPI   For follow up. Doing the precautionary measures at home to reduce risks of exposure COVID19. Also wearing mask when she is going out. No known sick contacts or known exposure to [address]. Has hx of seizure disorder since age 17. He has been stable on Depakote. No seizures since his initial diagnosis. He is followed at [facility] by Dr. Vern Corral. They have discussed him coming off of the seizure medication but no decision has been made. Was seen by GI and had negative colonoscopy except for hemorrhoids. Thinks blood was from the hemorrhoid. Has not had any further blood in the stool. Also has not had any further stomach pain. Also has had blood in the urine off and on over the last year. Seen by urology. Had negative CT. Told he would need to get a \"look into the bladder\" but he has not schedule test yet. 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 Patient Active Problem List   Diagnosis Code    Seizure ([facility] [address] R56.9    Seizures ([address] R56.9       Current Outpatient Medications   Medication Sig Dispense Refill    divalproex ER (DEPAKOTE ER) 500 mg ER tablet Take 500 mg by mouth two (2) times a day.  multivitamin (ONE A DAY) tablet Take 1 Tab by mouth daily. No Known Allergies    Past Medical History:   Diagnosis Date    Seizures ([address]        History reviewed. No pertinent surgical history.     Family History   Problem Relation Age of Onset    Hypertension Maternal Grandmother     Hypertension Paternal Grandmother     Cancer Paternal Grandfather         colon cancer    Headache Mother     Hypertension Father     Cancer Father         liver and colon cancer in his 46s    Thyroid Disease Sister     Cancer Paternal Uncle         pancreatic       Social History     Tobacco Use    Smoking status: Never Smoker    Smokeless tobacco: Never Used   Substance Use Topics    Alcohol use: No        Lab Results   Component Value Date/Time    WBC 5.3 03/08/2019 11:35 AM    HGB 13.4 03/08/2019 11:35 AM    Hemoglobin (POC) 14.5 07/11/2016 02:40 PM    HCT 40.9 03/08/2019 11:35 AM    Hematocrit (POC) 48% 06/19/2014 03:18 PM    PLATELET 337 85/09/0670 11:35 AM    MCV 79 03/08/2019 11:35 AM     Lab Results   Component Value Date/Time    Cholesterol, total 187 09/08/2020 11:00 AM    HDL Cholesterol 37 (L) 09/08/2020 11:00 AM    LDL, calculated 104 07/12/2017 12:50 PM    LDL Chol Calc (NIH) 125 (H) 09/08/2020 11:00 AM    Triglyceride 139 09/08/2020 11:00 AM     Lab Results   Component Value Date/Time    Sodium 140 09/08/2020 11:00 AM    Potassium 4.3 09/08/2020 11:00 AM    Chloride 101 09/08/2020 11:00 AM    CO2 24 09/08/2020 11:00 AM    Anion gap 10 05/15/2011 04:45 AM    Glucose 89 09/08/2020 11:00 AM    BUN 9 09/08/2020 11:00 AM    Creatinine 1.02 09/08/2020 11:00 AM    BUN/Creatinine ratio 9 09/08/2020 11:00 AM    GFR est  09/08/2020 11:00 AM    GFR est non- 09/08/2020 11:00 AM    Calcium 10.2 09/08/2020 11:00 AM    Bilirubin, total 0.6 09/08/2020 11:00 AM    ALT (SGPT) 30 09/08/2020 11:00 AM    Alk. phosphatase 89 09/08/2020 11:00 AM    Protein, total 7.8 09/08/2020 11:00 AM    Albumin 5.0 09/08/2020 11:00 AM    Globulin 3.7 05/15/2011 04:45 AM    A-G Ratio 1.8 09/08/2020 11:00 AM         Review of Systems   Constitutional: Negative for malaise/fatigue. HENT: Negative for congestion. Eyes: Negative for blurred vision. Respiratory: Negative for cough and shortness of breath. Cardiovascular: Negative for chest pain, palpitations and leg swelling. Gastrointestinal: Negative for abdominal pain, constipation and heartburn. Genitourinary: Negative for dysuria, frequency and urgency. Musculoskeletal: Negative for back pain and joint pain. Neurological: Negative for dizziness, tingling and headaches. Endo/Heme/Allergies: Negative for environmental allergies. Psychiatric/Behavioral: Negative for depression. The patient does not have insomnia. Physical Exam  Vitals signs and nursing note reviewed. Constitutional:       Appearance: Normal appearance. He is well-developed. Comments: /78   Pulse 79   Temp 97.5 °F (36.4 °C) (Skin)   Resp 12   Ht 6' 1\" (1.854 m)   Wt 194 lb 6.4 oz (88.2 kg)   SpO2 99%   BMI 25.65 kg/m²    HENT:      Right Ear: Tympanic membrane and ear canal normal.      Left Ear: Tympanic membrane and ear canal normal.      Nose: No mucosal edema or rhinorrhea. Neck:      Musculoskeletal: Normal range of motion and neck supple. Thyroid: No thyromegaly. Cardiovascular:      Rate and Rhythm: Normal rate and regular rhythm. Heart sounds: Normal heart sounds. Pulmonary:      Effort: Pulmonary effort is normal.      Breath sounds: Normal breath sounds. No wheezing, rhonchi or rales. Abdominal:      General: Bowel sounds are normal.      Palpations: Abdomen is soft. There is no mass. Tenderness: There is no abdominal tenderness. Musculoskeletal: Normal range of motion. General: No swelling. Lymphadenopathy:      Cervical: No cervical adenopathy. Skin:     General: Skin is warm and dry. Neurological:      General: No focal deficit present. Mental Status: He is alert and oriented to person, place, and time. Psychiatric:         Mood and Affect: Mood normal.         ASSESSMENT and PLAN  Diagnoses and all orders for this visit:    1. History of seizure disorder  As per neurology    2. History of gross hematuria  -     AMB POC URINALYSIS DIP STICK AUTO W/ MICRO  Follow up with urology as planned. 3. Screening-pulmonary TB  -     QUANTIFERON-TB GOLD PLUS      Follow-up and Dispositions    · Return in about 9 months (around 9/8/2021). I have discussed diagnosis listed in this note with pt and/or family.  I have discussed treatment plans and options and the risk/benefit analysis of those options, including safe use of medications and possible medication side effects. Through the use of shared decision making we have agreed to the above plan. The patient has received an after-visit summary and questions were answered concerning future plans and follow up. Advise pt of any urgent changes then to proceed to the ER.

## 2020-12-11 LAB
GAMMA INTERFERON BACKGROUND BLD IA-ACNC: 0.01 IU/ML
M TB IFN-G BLD-IMP: NEGATIVE
M TB IFN-G CD4+ BCKGRND COR BLD-ACNC: 0.01 IU/ML
MITOGEN IGNF BLD-ACNC: >10 IU/ML
QUANTIFERON INCUBATION, QF1T: NORMAL
QUANTIFERON TB2 AG: 0.01 IU/ML
SERVICE CMNT-IMP: NORMAL

## 2020-12-11 NOTE — PROGRESS NOTES
Please see if he needs neletter about the negative TB testing or just the results. See if he wants mailed or to .

## 2020-12-15 ENCOUNTER — TELEPHONE (OUTPATIENT)
Dept: FAMILY MEDICINE CLINIC | Age: 25
End: 2020-12-15

## 2020-12-15 NOTE — TELEPHONE ENCOUNTER
Left message for patient to call if he needs a letter concerning his negative TB test or does he want office to mail.

## 2020-12-15 NOTE — TELEPHONE ENCOUNTER
----- Message from Rebecca Romo MD sent at 12/11/2020  5:11 PM EST -----  Please see if he needs neletter about the negative TB testing or just the results. See if he wants mailed or to .

## 2021-03-30 ENCOUNTER — HOSPITAL ENCOUNTER (EMERGENCY)
Age: 26
Discharge: HOME | End: 2021-03-31
Attending: EMERGENCY MEDICINE
Payer: COMMERCIAL

## 2021-03-30 ENCOUNTER — APPOINTMENT (OUTPATIENT)
Dept: GENERAL RADIOLOGY | Age: 26
End: 2021-03-30
Attending: EMERGENCY MEDICINE
Payer: COMMERCIAL

## 2021-03-30 ENCOUNTER — APPOINTMENT (OUTPATIENT)
Dept: CT IMAGING | Age: 26
End: 2021-03-30
Attending: EMERGENCY MEDICINE
Payer: COMMERCIAL

## 2021-03-30 DIAGNOSIS — S42.291A HUMERAL HEAD FRACTURE, RIGHT, CLOSED, INITIAL ENCOUNTER: ICD-10-CM

## 2021-03-30 DIAGNOSIS — S42.101A CLOSED FRACTURE OF RIGHT SCAPULA, UNSPECIFIED PART OF SCAPULA, INITIAL ENCOUNTER: ICD-10-CM

## 2021-03-30 DIAGNOSIS — R56.9 SEIZURE (HCC): Primary | ICD-10-CM

## 2021-03-30 DIAGNOSIS — S43.004A CLOSED DISLOCATION OF RIGHT SHOULDER, INITIAL ENCOUNTER: ICD-10-CM

## 2021-03-30 LAB
ALBUMIN SERPL-MCNC: 3.8 G/DL (ref 3.5–5)
ALBUMIN/GLOB SERPL: 1.2 {RATIO} (ref 1.1–2.2)
ALP SERPL-CCNC: 104 U/L (ref 45–117)
ALT SERPL-CCNC: 37 U/L (ref 12–78)
ANION GAP SERPL CALC-SCNC: 17 MMOL/L (ref 5–15)
AST SERPL-CCNC: 20 U/L (ref 15–37)
BILIRUB SERPL-MCNC: 0.2 MG/DL (ref 0.2–1)
BUN SERPL-MCNC: 12 MG/DL (ref 6–20)
BUN/CREAT SERPL: 7 (ref 12–20)
CALCIUM SERPL-MCNC: 8.7 MG/DL (ref 8.5–10.1)
CHLORIDE SERPL-SCNC: 106 MMOL/L (ref 97–108)
CO2 SERPL-SCNC: 17 MMOL/L (ref 21–32)
CREAT SERPL-MCNC: 1.63 MG/DL (ref 0.7–1.3)
GLOBULIN SER CALC-MCNC: 3.1 G/DL (ref 2–4)
GLUCOSE SERPL-MCNC: 168 MG/DL (ref 65–100)
LACTATE BLD-SCNC: 10.1 MMOL/L (ref 0.4–2)
POTASSIUM SERPL-SCNC: 4.3 MMOL/L (ref 3.5–5.1)
PROT SERPL-MCNC: 6.9 G/DL (ref 6.4–8.2)
SODIUM SERPL-SCNC: 140 MMOL/L (ref 136–145)
VALPROATE SERPL-MCNC: 15 UG/ML (ref 50–100)

## 2021-03-30 PROCEDURE — 71045 X-RAY EXAM CHEST 1 VIEW: CPT

## 2021-03-30 PROCEDURE — 80164 ASSAY DIPROPYLACETIC ACD TOT: CPT

## 2021-03-30 PROCEDURE — 70450 CT HEAD/BRAIN W/O DYE: CPT

## 2021-03-30 PROCEDURE — 73030 X-RAY EXAM OF SHOULDER: CPT

## 2021-03-30 PROCEDURE — 93005 ELECTROCARDIOGRAM TRACING: CPT

## 2021-03-30 PROCEDURE — 99285 EMERGENCY DEPT VISIT HI MDM: CPT

## 2021-03-30 PROCEDURE — 85025 COMPLETE CBC W/AUTO DIFF WBC: CPT

## 2021-03-30 PROCEDURE — 36415 COLL VENOUS BLD VENIPUNCTURE: CPT

## 2021-03-30 PROCEDURE — 83605 ASSAY OF LACTIC ACID: CPT

## 2021-03-30 PROCEDURE — 80053 COMPREHEN METABOLIC PANEL: CPT

## 2021-03-30 PROCEDURE — 74011250636 HC RX REV CODE- 250/636: Performed by: EMERGENCY MEDICINE

## 2021-03-30 RX ADMIN — SODIUM CHLORIDE 1000 ML: 9 INJECTION, SOLUTION INTRAVENOUS at 22:43

## 2021-03-31 ENCOUNTER — APPOINTMENT (OUTPATIENT)
Dept: CT IMAGING | Age: 26
End: 2021-03-31
Attending: NURSE PRACTITIONER
Payer: COMMERCIAL

## 2021-03-31 ENCOUNTER — APPOINTMENT (OUTPATIENT)
Dept: GENERAL RADIOLOGY | Age: 26
End: 2021-03-31
Attending: EMERGENCY MEDICINE
Payer: COMMERCIAL

## 2021-03-31 VITALS
OXYGEN SATURATION: 98 % | HEIGHT: 73 IN | BODY MASS INDEX: 24.52 KG/M2 | WEIGHT: 185 LBS | RESPIRATION RATE: 18 BRPM | HEART RATE: 94 BPM | SYSTOLIC BLOOD PRESSURE: 125 MMHG | TEMPERATURE: 97.7 F | DIASTOLIC BLOOD PRESSURE: 71 MMHG

## 2021-03-31 LAB
AMPHET UR QL SCN: NEGATIVE
APPEARANCE UR: CLEAR
ATRIAL RATE: 127 BPM
BACTERIA URNS QL MICRO: NEGATIVE /HPF
BARBITURATES UR QL SCN: NEGATIVE
BASOPHILS # BLD: 0.2 K/UL (ref 0–0.1)
BASOPHILS NFR BLD: 1 % (ref 0–1)
BENZODIAZ UR QL: NEGATIVE
BILIRUB UR QL: NEGATIVE
CALCULATED P AXIS, ECG09: 55 DEGREES
CALCULATED R AXIS, ECG10: 82 DEGREES
CALCULATED T AXIS, ECG11: 24 DEGREES
CANNABINOIDS UR QL SCN: POSITIVE
COCAINE UR QL SCN: NEGATIVE
COLOR UR: ABNORMAL
DIAGNOSIS, 93000: NORMAL
DIFFERENTIAL METHOD BLD: ABNORMAL
DRUG SCRN COMMENT,DRGCM: ABNORMAL
EOSINOPHIL # BLD: 0.3 K/UL (ref 0–0.4)
EOSINOPHIL NFR BLD: 2 % (ref 0–7)
EPITH CASTS URNS QL MICRO: ABNORMAL /LPF
ERYTHROCYTE [DISTWIDTH] IN BLOOD BY AUTOMATED COUNT: 14.2 % (ref 11.5–14.5)
GLUCOSE UR STRIP.AUTO-MCNC: NEGATIVE MG/DL
HCT VFR BLD AUTO: 43.5 % (ref 36.6–50.3)
HGB BLD-MCNC: 13.9 G/DL (ref 12.1–17)
HGB UR QL STRIP: ABNORMAL
IMM GRANULOCYTES # BLD AUTO: 0.9 K/UL (ref 0–0.04)
IMM GRANULOCYTES NFR BLD AUTO: 6 % (ref 0–0.5)
KETONES UR QL STRIP.AUTO: NEGATIVE MG/DL
LACTATE BLD-SCNC: 3.9 MMOL/L (ref 0.4–2)
LEUKOCYTE ESTERASE UR QL STRIP.AUTO: NEGATIVE
LYMPHOCYTES # BLD: 4.4 K/UL (ref 0.8–3.5)
LYMPHOCYTES NFR BLD: 29 % (ref 12–49)
MCH RBC QN AUTO: 26.1 PG (ref 26–34)
MCHC RBC AUTO-ENTMCNC: 32 G/DL (ref 30–36.5)
MCV RBC AUTO: 81.8 FL (ref 80–99)
METHADONE UR QL: NEGATIVE
MONOCYTES # BLD: 1.1 K/UL (ref 0–1)
MONOCYTES NFR BLD: 7 % (ref 5–13)
NEUTS SEG # BLD: 8.1 K/UL (ref 1.8–8)
NEUTS SEG NFR BLD: 55 % (ref 32–75)
NITRITE UR QL STRIP.AUTO: NEGATIVE
NRBC # BLD: 0 K/UL (ref 0–0.01)
NRBC BLD-RTO: 0 PER 100 WBC
OPIATES UR QL: NEGATIVE
P-R INTERVAL, ECG05: 148 MS
PCP UR QL: NEGATIVE
PH UR STRIP: 5.5 [PH] (ref 5–8)
PLATELET # BLD AUTO: 271 K/UL (ref 150–400)
PMV BLD AUTO: 10.2 FL (ref 8.9–12.9)
PROT UR STRIP-MCNC: NEGATIVE MG/DL
Q-T INTERVAL, ECG07: 304 MS
QRS DURATION, ECG06: 100 MS
QTC CALCULATION (BEZET), ECG08: 441 MS
RBC # BLD AUTO: 5.32 M/UL (ref 4.1–5.7)
RBC #/AREA URNS HPF: ABNORMAL /HPF (ref 0–5)
RBC MORPH BLD: ABNORMAL
SP GR UR REFRACTOMETRY: 1.01 (ref 1–1.03)
UA: UC IF INDICATED,UAUC: ABNORMAL
UROBILINOGEN UR QL STRIP.AUTO: 0.2 EU/DL (ref 0.2–1)
VENTRICULAR RATE, ECG03: 127 BPM
WBC # BLD AUTO: 15 K/UL (ref 4.1–11.1)
WBC URNS QL MICRO: ABNORMAL /HPF (ref 0–4)

## 2021-03-31 PROCEDURE — 96365 THER/PROPH/DIAG IV INF INIT: CPT

## 2021-03-31 PROCEDURE — 96375 TX/PRO/DX INJ NEW DRUG ADDON: CPT

## 2021-03-31 PROCEDURE — 83605 ASSAY OF LACTIC ACID: CPT

## 2021-03-31 PROCEDURE — 2709999900 HC NON-CHARGEABLE SUPPLY

## 2021-03-31 PROCEDURE — 74011250636 HC RX REV CODE- 250/636

## 2021-03-31 PROCEDURE — 74011250636 HC RX REV CODE- 250/636: Performed by: EMERGENCY MEDICINE

## 2021-03-31 PROCEDURE — 75810000301 HC ER LEVEL 1 CLOSED TREATMNT FRACTURE/DISLOCATION

## 2021-03-31 PROCEDURE — 74011000250 HC RX REV CODE- 250: Performed by: EMERGENCY MEDICINE

## 2021-03-31 PROCEDURE — 73030 X-RAY EXAM OF SHOULDER: CPT

## 2021-03-31 PROCEDURE — 80307 DRUG TEST PRSMV CHEM ANLYZR: CPT

## 2021-03-31 PROCEDURE — 81001 URINALYSIS AUTO W/SCOPE: CPT

## 2021-03-31 PROCEDURE — 74011000258 HC RX REV CODE- 258: Performed by: EMERGENCY MEDICINE

## 2021-03-31 PROCEDURE — 73200 CT UPPER EXTREMITY W/O DYE: CPT

## 2021-03-31 RX ORDER — PROPOFOL 10 MG/ML
INJECTION, EMULSION INTRAVENOUS
Status: COMPLETED
Start: 2021-03-31 | End: 2021-03-31

## 2021-03-31 RX ORDER — PROPOFOL 10 MG/ML
200 INJECTION, EMULSION INTRAVENOUS
Status: COMPLETED | OUTPATIENT
Start: 2021-03-31 | End: 2021-03-31

## 2021-03-31 RX ORDER — KETOROLAC TROMETHAMINE 30 MG/ML
15 INJECTION, SOLUTION INTRAMUSCULAR; INTRAVENOUS
Status: COMPLETED | OUTPATIENT
Start: 2021-03-31 | End: 2021-03-31

## 2021-03-31 RX ORDER — HYDROCODONE BITARTRATE AND ACETAMINOPHEN 5; 325 MG/1; MG/1
1 TABLET ORAL
Qty: 18 TAB | Refills: 0 | Status: SHIPPED | OUTPATIENT
Start: 2021-03-31 | End: 2021-04-03

## 2021-03-31 RX ORDER — FENTANYL CITRATE 50 UG/ML
50 INJECTION, SOLUTION INTRAMUSCULAR; INTRAVENOUS
Status: COMPLETED | OUTPATIENT
Start: 2021-03-31 | End: 2021-03-31

## 2021-03-31 RX ADMIN — PROPOFOL INJECTABLE EMULSION 100 MG: 10 INJECTION, EMULSION INTRAVENOUS at 04:16

## 2021-03-31 RX ADMIN — PROPOFOL INJECTABLE EMULSION 200 MG: 10 INJECTION, EMULSION INTRAVENOUS at 03:58

## 2021-03-31 RX ADMIN — SODIUM CHLORIDE 500 MG: 900 INJECTION, SOLUTION INTRAVENOUS at 00:52

## 2021-03-31 RX ADMIN — PROPOFOL 100 MG: 10 INJECTION, EMULSION INTRAVENOUS at 04:16

## 2021-03-31 RX ADMIN — FENTANYL CITRATE 50 MCG: 50 INJECTION, SOLUTION INTRAMUSCULAR; INTRAVENOUS at 00:50

## 2021-03-31 RX ADMIN — PROPOFOL 170 MG: 10 INJECTION, EMULSION INTRAVENOUS at 02:25

## 2021-03-31 RX ADMIN — KETOROLAC TROMETHAMINE 15 MG: 30 INJECTION, SOLUTION INTRAMUSCULAR at 00:08

## 2021-03-31 NOTE — ED NOTES
Patient was provided with discharge instructions. Instructions and any medications were reviewed with the patient &/or family by Dr. Leopoldo Arabia. Questions and concerns addressed by the provider. Patient discharged in stable condition via wheelchair and was escorted by his mother.

## 2021-03-31 NOTE — ED NOTES
While patient's eyes are still closed primarily he will open them on command, follow commands appropriately, answer questions appropriately, is asking about the time and attempting to put his covers back over him. For this reason, I have marked him as alert and will complete the post-sedation screening. Waiting on ortho PA to arrive at this time.

## 2021-03-31 NOTE — ED PROVIDER NOTES
EMERGENCY DEPARTMENT HISTORY AND PHYSICAL EXAM      Date: 3/30/2021  Patient Name: Vickie York    History of Presenting Illness     Chief Complaint   Patient presents with    Seizure     Patient brought in by EMS from home for seizure. Patient has history of seizures and takes Depakote for it. Patient still a little confused and lethargic. Patient tachycardic, hypotensive and complaining of right shoulder pain. Patient also admits to taking a THC gummy today. History Provided By: Patient and EMS    HPI: Vickie York, 23 y.o. male with PMHx significant for seizure disorder on Depakote who reportedly has not missed any doses presents to the ED after 2 seizures at home within 10 minutes. EMS reports that sats were low 90s on their arrival.  Patient initially confused and postictal, now reports that he ate 1 THC gummy today. Denies any alcohol use. He complains of right shoulder pain. Also feels dizzy. Denies any headache, chest pain, shortness of breath, nausea, vomiting, diarrhea, abdominal pain. Noted to be hypotensive and tachycardic with heart rates in the 130s and blood pressures in the 80s on arrival.  PCP: Hollie Rogel NP    No current facility-administered medications on file prior to encounter. Current Outpatient Medications on File Prior to Encounter   Medication Sig Dispense Refill    divalproex ER (DEPAKOTE ER) 500 mg ER tablet Take 500 mg by mouth two (2) times a day.  multivitamin (ONE A DAY) tablet Take 1 Tab by mouth daily. Past History     Past Medical History:  Past Medical History:   Diagnosis Date    Seizures [facility])        Past Surgical History:  No past surgical history on file.     Family History:  Family History   Problem Relation Age of Onset    Hypertension Maternal Grandmother     Hypertension Paternal Grandmother     Cancer Paternal Grandfather         colon cancer    Headache Mother     Hypertension Father     Cancer Father         liver and colon cancer in his 46s    Thyroid Disease Sister     Cancer Paternal Uncle         pancreatic       Social History:  Social History     Tobacco Use    Smoking status: Never Smoker    Smokeless tobacco: Never Used   Substance Use Topics    Alcohol use: No    Drug use: No       Allergies:  No Known Allergies      Review of Systems   Review of Systems   Constitutional: Negative for chills and fever. HENT: Negative for congestion, ear pain, rhinorrhea and sore throat. Respiratory: Negative for cough, chest tightness and shortness of breath. Cardiovascular: Negative for chest pain and palpitations. Gastrointestinal: Negative for abdominal pain, diarrhea, nausea and vomiting. Genitourinary: Negative for dysuria, flank pain and hematuria. Musculoskeletal: Positive for arthralgias ( Right shoulder). Negative for myalgias and neck pain. Skin: Negative for rash and wound. Neurological: Positive for dizziness, seizures and light-headedness. Negative for syncope, speech difficulty, weakness and headaches. Psychiatric/Behavioral: The patient is nervous/anxious. All other systems reviewed and are negative.         Physical Exam   General appearance -thin, well appearing, and in no distress  Eyes - pupils equal and reactive, extraocular eye movements intact  ENT - mucous membranes moist, pharynx normal without lesions, laceration bilateral tongue  Neck - supple, no significant adenopathy; non-tender to palpation  Chest - clear to auscultation, no wheezes, rales or rhonchi; non-tender to palpation  Heart -tachycardic, regular rhythm, S1 and S2 normal, no murmurs noted  Abdomen - soft, nontender, nondistended, no masses or organomegaly  Musculoskeletal -tender to palpation right shoulder with decreased range of motion due to pain   extremities - peripheral pulses normal, no pedal edema  Skin - normal coloration and turgor, no rashes  Neurological - alert, oriented x3, normal speech, no focal findings or movement disorder noted    Diagnostic Study Results     Labs -     Recent Results (from the past 12 hour(s))   EKG, 12 LEAD, INITIAL    Collection Time: 03/30/21 10:28 PM   Result Value Ref Range    Ventricular Rate 127 BPM    Atrial Rate 127 BPM    P-R Interval 148 ms    QRS Duration 100 ms    Q-T Interval 304 ms    QTC Calculation (Bezet) 441 ms    Calculated P Axis 55 degrees    Calculated R Axis 82 degrees    Calculated T Axis 24 degrees    Diagnosis       Sinus tachycardia  Possible Lateral infarct , age undetermined  Cannot rule out Inferior infarct , age undetermined  No previous ECGs available     POC LACTIC ACID    Collection Time: 03/30/21 10:43 PM   Result Value Ref Range    Lactic Acid (POC) 10.10 (HH) 0.40 - 2.00 mmol/L       Radiologic Studies -   XR SHOULDER RT AP/LAT MIN 2 V    (Results Pending)   XR CHEST PORT    (Results Pending)     CT Results  (Last 48 hours)    None        CXR Results  (Last 48 hours)    None            Medical Decision Making   I am the first provider for this patient. I reviewed the vital signs, available nursing notes, past medical history, past surgical history, family history and social history. Vital Signs-Reviewed the patient's vital signs. Patient Vitals for the past 12 hrs:   Temp Pulse Resp BP SpO2   03/30/21 2244  (!) 120 20 (!) 92/52 99 %   03/30/21 2230  (!) 127 22 (!) 89/52 94 %   03/30/21 2222 97.7 °F (36.5 °C) (!) 126 20 (!) 82/51 91 %       EKG at 10:28 PM on March 30, 2021 interpreted by me: Sinus tachycardia, 127 bpm, normal axis, normal VA, QRS, QTC intervals, no ischemic changes    Records Reviewed: Nursing Notes and Old Medical Records    Provider Notes (Medical Decision Making):   Differential diagnosis: Seizure, substance abuse, dehydration, electrolyte abnormality. Check CBC, CMP, UA, drug screen, lactate, EKG, valproic acid level. Will treat with IV fluids. Monitor vital signs. ED Course:   Initial assessment performed.  The patients presenting problems have been discussed, and they are in agreement with the care plan formulated and outlined with them. I have encouraged them to ask questions as they arise throughout their visit. Progress Notes:  ED Course as of Apr 01 1054 Tue Mar 30, 2021   2321 Mother is now present in the ED. States that patient has never had a tonic-clonic seizure before. He is on Depakote because he had myoclonic jerks starting at age 17, and today his seizure was very different than usual.  He has not had any problems in the last 10 years. He is followed by Dr. Aric Conley from neurology. Tonight he started having myoclonic jerks at about 9 PM.  Then after an hour or 2 had a full tonic-clonic seizure that lasted about 5 to 10 minutes with foaming at the mouth and mother reports patient bit his tongue. Will order head CT.    [AO]   Wed Mar 31, 2021   0254 Procedure Note - Procedural Sedation:     Indication:  Procedural sedation is required to perform the following procedure:  right shoulder reduction    Informed Consent:  The reason for the procedure as well as the risks, benefits, and alternatives to the procedure have been explained to the patient and He consents to the procedure. The consent has been signed by the patient/representative, the medical provider and witnessed by the patients nurse. Anesthesia Risks: The ASA score is ASA 2 - Mild systemic disease. The patient is having all vital signs monitored by an RN as well as pulse oximetry. Mallampati Score Reference: The patient has a patent airway with a Mallampati Classification Score of III (soft palate, base of uvula visible). ?                          Pre-Procedure Sedation Assessment:  Sedation Start Time:0238  Time Out was performed to confirm patient identity, laterality and indication prior to procedural sedation.     Post Procedure Sedation Assessment:   Sedation End Time: 6771    The patient was sedated with a total of 170mg propofol/DIPRIVAN via IV. Reversal agents and resuscitation equipment were at the bedside. The right shoulder reduction was unsuccessful. Reversal agents were not used. Patient tolerated procedure well. Complications: None     April Juana Brar MD         [AO]   0301 Procedure Note - Procedural Sedation:     Indication:  Procedural sedation is required to perform the following procedure:  Right shoulder reduction    Informed Consent:  The reason for the procedure as well as the risks, benefits, and alternatives to the procedure have been explained to the patient and parent and He consents to the procedure. The consent has been signed by the patient/representative, the medical provider and witnessed by the patients nurse. Anesthesia Risks: The ASA score is ASA 2 - Mild systemic disease. The patient is having all vital signs monitored by an RN as well as pulse oximetry. Mallampati Score Reference: The patient has a patent airway with a Mallampati Classification Score of III (soft palate, base of uvula visible). Pre-Procedure Sedation Assessment:  Sedation Start Time: 0359  Time Out was performed to confirm patient identity, laterality and indication prior to procedural sedation. Post Procedure Sedation Assessment:   Sedation End Time: 1173    The patient was sedated with a total of 300mg propofol/DIPRIVAN and via IV. Reversal agents and resuscitation equipment were at the bedside. The right shoulder reduction was done and the patient tolerated the procedure well with no complications. Reversal agents were not used. Patient tolerated procedure well. Complications: None     Hayley Curiel MD       Procedure Note - Reduction:      Performed by Rahul Rm Np (ortho). Procedure start time: 3469  Procedure end time: 9237  Procedure was performed without a block.    Medications provided as below:  Medications   sodium chloride 0.9 % bolus infusion 1,000 mL (0 mL IntraVENous IV Completed 3/30/21 2343)   ketorolac (TORADOL) injection 15 mg (15 mg IntraVENous Given 3/31/21 0008)   valproate (DEPACON) 500 mg in 0.9% sodium chloride 50 mL IVPB (0 mg IntraVENous IV Completed 3/31/21 0152)   fentaNYL citrate (PF) injection 50 mcg (50 mcg IntraVENous Given 3/31/21 0050)   propofoL (DIPRIVAN) 10 mg/mL injection 200 mg (170 mg IntraVENous Given 3/31/21 0225)   propofoL (DIPRIVAN) 10 mg/mL injection 200 mg (200 mg IntraVENous Given 3/31/21 0358)   propofoL (DIPRIVAN) 10 mg/mL injection 200 mg (100 mg IntraVENous Given 3/31/21 0416)       Immediately prior to the procedure, the patient was reevaluated and found suitable for the planned procedure and any planned medications. Immediately prior to the procedure a time out was called to verify the correct patient, procedure, equipment, staff, and marking as appropriate. Prior to the procedure, neurovascular exam was intact. Analgesia was obtained with procedural sedation - see record. To achieve reduction of the patient's right shoulder joint, logitudinal traction manipulation was utilized. The joint was successfully reduced. Neurovascular exam was intact following the procedure. Post reduction x-ray ordered. The procedure took 16-30 minutes, and pt tolerated well. [AO]   [id number] Patient is back to baseline mental status. He is tolerating p.o. and ready for discharge.    [AO]      ED Course User Index   Hayley Curiel MD     5916  Case discussed with Danielle Dumont NP prior to first attempt at reduction. Patient had displaced humerus fracture and shoulder dislocation. Nurse practitioner Brittnee recommended attempting reduction in usual fashion. Initial conscious sedation and attempt at reduction were unsuccessful so nurse practitioner Brittnee was recontacted. She stated she would come into assist with reduction. Patient was resedated when she arrived and after much effort, reduction was successful.   Ronald Dawn NP was concerned that patient may also have pectoralis muscle tear given the amount of swelling in his shoulder and possible palpable defect in pectoralis muscle. At her request, CT of shoulder was completed which showed scapular fracture as well as the humerus fracture that was seen on initial x-ray. Joint was reduced. Patient placed in shoulder immobilizer. Will follow up with Dr. Caroline Landis. Also discussed with mother that patient should follow-up with neurology. His valproic acid level was low here, probably because he only took half of his dose last night. He may need to see an adult neurologist as he is now 23 so mother was given name of [place] [employer] neurologist for follow-up if she chooses. Disposition:  discharge    PLAN:  1. Discharge Medication List as of 3/31/2021  6:35 AM      CONTINUE these medications which have NOT CHANGED    Details   divalproex ER (DEPAKOTE ER) 500 mg ER tablet Take 500 mg by mouth two (2) times a day., Historical Med      multivitamin (ONE A DAY) tablet Take 1 Tab by mouth daily. , Historical Med           2. Follow-up Information     Follow up With Specialties Details Why Contact Info    Kenn James MD Orthopedic Surgery Schedule an appointment as soon as possible for a visit   [address] [place] [address]  [address] [address] [phone]      Darshana William NP Nurse Practitioner Call   [address] [facility]       Dong Barkley, DO Neurology Schedule an appointment as soon as possible for a visit  if you would prefer an adult neurologist [facility] [address]  [address] [facility] Terence Iglesias MD Pediatric Neurology Call today  [place]  [address]  [phone]          Return to ED if worse     Diagnosis     Clinical Impression:   1. Seizure ([address]    2. Closed dislocation of right shoulder, initial encounter    3.  Humeral head fracture, right, closed, initial encounter 4. Closed fracture of right scapula, unspecified part of scapula, initial encounter

## 2021-03-31 NOTE — PROCEDURES
PROCEDURE NOTE - FRACTURE REDUCTION: The patient was induced with propofol for procedrual sedation via the emergency department MD. After it was confirmed that appropriate sedation had been reached, a longitudinal traction was applied and shoulder was reduced. Subsequently, this was confirmed with bedside images, a shoulder immobilizer was applied and again reduction was confirmed with bedside imaging. The patient was aroused from anesthesia and tolerated the procedure well. Post-reduction plain films reviewed with confirmation of a satisfactory reduction of the dislocation. The extremity was neurovascularly intact post reduction.

## 2021-03-31 NOTE — ED NOTES
Patient has been given a total of 470 mg of Propofol in total between the two sedations. Due to this, I will monitor him every 15 minutes instead of every 5 minutes. Verified with charge nurse.

## 2021-03-31 NOTE — CONSULTS
ORTHOPAEDIC CONSULT NOTE    Subjective:     Date of Consultation:  March 31, 2021      Christie Blakely is a 23 y.o. male who is being seen for  R shoulder pain with deformity that was noted after a sz last night. Work up has reveled a R shoulder dislocation with humeral head fx. R hand dominant. Pt's family at bedside during exam. Plan of care discussed with pt and family, all questions/concerns addressed and pt and family verbalized understanding of plan of care. Patient Active Problem List    Diagnosis Date Noted    Seizures ([address]     Seizure ([address] 08/16/2012     Family History   Problem Relation Age of Onset    Hypertension Maternal Grandmother     Hypertension Paternal Grandmother     Cancer Paternal Grandfather         colon cancer    Headache Mother     Hypertension Father     Cancer Father         liver and colon cancer in his 46s    Thyroid Disease Sister     Cancer Paternal Uncle         pancreatic      Social History     Tobacco Use    Smoking status: Never Smoker    Smokeless tobacco: Never Used   Substance Use Topics    Alcohol use: No     Past Medical History:   Diagnosis Date    [facility]       No past surgical history on file. Prior to Admission medications    Medication Sig Start Date End Date Taking? Authorizing Provider   divalproex ER (DEPAKOTE ER) 500 mg ER tablet Take 500 mg by mouth two (2) times a day. 6/5/17   Provider, Historical   multivitamin (ONE A DAY) tablet Take 1 Tab by mouth daily. Provider, Historical     No current facility-administered medications for this encounter. Current Outpatient Medications   Medication Sig    divalproex ER (DEPAKOTE ER) 500 mg ER tablet Take 500 mg by mouth two (2) times a day.  multivitamin (ONE A DAY) tablet Take 1 Tab by mouth daily. No Known Allergies     Review of Systems:  A comprehensive review of systems was negative except for that written in the HPI.     Mental Status: no dementia    Objective: Patient Vitals for the past 8 hrs:   BP Temp Pulse Resp SpO2 Height Weight   21 0459 123/66 97.7 °F (36.5 °C) 78 16 98 %     21 0430 120/70  87 18 99 %     21 0424 122/71  86 18 100 %     21 0419 132/76  86 18 100 %     21 0414 129/67  81 16 100 %     21 0409 134/69  83 18 100 %     21 0404 126/85  91 16 100 %     21 0400 (!) 142/90  (!) 101 18 100 %     21 0334 (!) 141/71  76 16 100 %     21 0328 (!) 147/82  80 16 100 %     21 0324 (!) 140/80  82 18 100 %     21 0318 138/73  80 16 100 %     21 0314 134/78  77 16 100 %     21 0309 113/89  82 16 100 %     21 0304 134/74  77 16 100 %     21 0259 135/77  80 16 100 %     21 0254 134/78  79 16 100 %     21 0249 128/76  86 16 100 %     21 0247 128/80  85 16 100 %     21 0244 (!) 134/94  96 16 99 %     21 0237 (!) 145/99  (!) 110 20 100 %     21 0132 129/78 97.9 °F (36.6 °C) 85 18 100 %     21 0054 (!) 145/83 98 °F (36.7 °C) 86 18 100 %     21 0008 120/71 98 °F (36.7 °C) 88 18 100 %     21 0004  97.9 °F (36.6 °C) 93 19 100 %     21 2244 (!) 92/52  (!) 120 20 99 %     21 2230 (!) 89/52  (!) 127 22 94 %     21 2222 (!) 82/51 97.7 °F (36.5 °C) (!) 126 20 91 % 6' 1\" (1.854 m) 83.9 kg (185 lb)     Temp (24hrs), Av.9 °F (36.6 °C), Min:97.7 °F (36.5 °C), Max:98 °F (36.7 °C)      Gen: Well-developed,  in no acute distress   Musc: REU - + deformity of shoulder with large effsuion/ edema noted on exam, sulcus noted over chest wall at the pec, NVI    Skin: No skin breakdown noted.  Skin warm, pink, dry  Neuro: Cranial nerves are grossly intact, no focal motor weakness, follows commands appropriately   Psych: Good insight, oriented to person, place and time, alert    Imaging Review:        XR SHOULDER RT AP/LAT MIN 2 V (Final result)  Result time 03/31/21 00:21:49  Final result                Impression:    Shoulder dislocation, with associated fracture through the lateral   aspect of the humeral head with displaced fracture fragment. Anterior versus   posterior dislocation cannot be discerned due to limited technique and   difficulty positioning. Narrative:    INDICATION:  Shoulder injury status post seizure     COMPARISON: None     FINDINGS: 3 views of the right shoulder are technically limited due to   difficulty with patient positioning. There is evidence of a shoulder dislocation   inferiorly. Anterior or posterior dislocation cannot be discerned due to limited   technique. There is an associated obliquely oriented fracture through the   humeral head.  There is displacement of this fracture fragment.                       Labs:   Recent Results (from the past 24 hour(s))   EKG, 12 LEAD, INITIAL    Collection Time: 03/30/21 10:28 PM   Result Value Ref Range    Ventricular Rate 127 BPM    Atrial Rate 127 BPM    P-R Interval 148 ms    QRS Duration 100 ms    Q-T Interval 304 ms    QTC Calculation (Bezet) 441 ms    Calculated P Axis 55 degrees    Calculated R Axis 82 degrees    Calculated T Axis 24 degrees    Diagnosis       Sinus tachycardia  Possible Lateral infarct , age undetermined  Cannot rule out Inferior infarct , age undetermined  No previous ECGs available     CBC WITH AUTOMATED DIFF    Collection Time: 03/30/21 10:41 PM   Result Value Ref Range    WBC 15.0 (H) 4.1 - 11.1 K/uL    RBC 5.32 4.10 - 5.70 M/uL    HGB 13.9 12.1 - 17.0 g/dL    HCT 43.5 36.6 - 50.3 %    MCV 81.8 80.0 - 99.0 FL    MCH 26.1 26.0 - 34.0 PG    MCHC 32.0 30.0 - 36.5 g/dL    RDW 14.2 11.5 - 14.5 %    PLATELET 649 227 - 919 K/uL    MPV 10.2 8.9 - 12.9 FL    NRBC 0.0 0  WBC    ABSOLUTE NRBC 0.00 0.00 - 0.01 K/uL    NEUTROPHILS 55 32 - 75 %    LYMPHOCYTES 29 12 - 49 %    MONOCYTES 7 5 - 13 %    EOSINOPHILS 2 0 - 7 %    BASOPHILS 1 0 - 1 % IMMATURE GRANULOCYTES 6 (H) 0.0 - 0.5 %    ABS. NEUTROPHILS 8.1 (H) 1.8 - 8.0 K/UL    ABS. LYMPHOCYTES 4.4 (H) 0.8 - 3.5 K/UL    ABS. MONOCYTES 1.1 (H) 0.0 - 1.0 K/UL    ABS. EOSINOPHILS 0.3 0.0 - 0.4 K/UL    ABS. BASOPHILS 0.2 (H) 0.0 - 0.1 K/UL    ABS. IMM. GRANS. 0.9 (H) 0.00 - 0.04 K/UL    DF SMEAR SCANNED      RBC COMMENTS SMUDGE CELLS SEEN  ANISOCYTOSIS  1+       METABOLIC PANEL, COMPREHENSIVE    Collection Time: 03/30/21 10:41 PM   Result Value Ref Range    Sodium 140 136 - 145 mmol/L    Potassium 4.3 3.5 - 5.1 mmol/L    Chloride 106 97 - 108 mmol/L    CO2 17 (L) 21 - 32 mmol/L    Anion gap 17 (H) 5 - 15 mmol/L    Glucose 168 (H) 65 - 100 mg/dL    BUN 12 6 - 20 MG/DL    Creatinine 1.63 (H) 0.70 - 1.30 MG/DL    BUN/Creatinine ratio 7 (L) 12 - 20      GFR est AA >60 >60 ml/min/1.73m2    GFR est non-AA 53 (L) >60 ml/min/1.73m2    Calcium 8.7 8.5 - 10.1 MG/DL    Bilirubin, total 0.2 0.2 - 1.0 MG/DL    ALT (SGPT) 37 12 - 78 U/L    AST (SGOT) 20 15 - 37 U/L    Alk.  phosphatase 104 45 - 117 U/L    Protein, total 6.9 6.4 - 8.2 g/dL    Albumin 3.8 3.5 - 5.0 g/dL    Globulin 3.1 2.0 - 4.0 g/dL    A-G Ratio 1.2 1.1 - 2.2     VALPROIC ACID    Collection Time: 03/30/21 10:41 PM   Result Value Ref Range    Valproic acid 15 (L) 50 - 100 ug/ml   POC LACTIC ACID    Collection Time: 03/30/21 10:43 PM   Result Value Ref Range    Lactic Acid (POC) 10.10 (HH) 0.40 - 2.00 mmol/L   POC LACTIC ACID    Collection Time: 03/31/21  1:33 AM   Result Value Ref Range    Lactic Acid (POC) 3.90 (HH) 0.40 - 2.00 mmol/L   URINALYSIS W/ REFLEX CULTURE    Collection Time: 03/31/21  2:34 AM    Specimen: Urine   Result Value Ref Range    Color YELLOW/STRAW      Appearance CLEAR CLEAR      Specific gravity 1.015 1.003 - 1.030      pH (UA) 5.5 5.0 - 8.0      Protein Negative NEG mg/dL    Glucose Negative NEG mg/dL    Ketone Negative NEG mg/dL    Bilirubin Negative NEG      Blood TRACE (A) NEG      Urobilinogen 0.2 0.2 - 1.0 EU/dL    Nitrites Negative NEG      Leukocyte Esterase Negative NEG      WBC 5-10 0 - 4 /hpf    RBC 0-5 0 - 5 /hpf    Epithelial cells FEW FEW /lpf    Bacteria Negative NEG /hpf    UA:UC IF INDICATED CULTURE NOT INDICATED BY UA RESULT CNI     DRUG SCREEN, URINE    Collection Time: 03/31/21  2:34 AM   Result Value Ref Range    AMPHETAMINES Negative NEG      BARBITURATES Negative NEG      BENZODIAZEPINES Negative NEG      COCAINE Negative NEG      METHADONE Negative NEG      OPIATES Negative NEG      PCP(PHENCYCLIDINE) Negative NEG      THC (TH-CANNABINOL) Positive (A) NEG      Drug screen comment (NOTE)          Impression:     Patient Active Problem List    Diagnosis Date Noted    Seizures ([facility]     Seizure ([facility] 08/16/2012     Active Problems:    * No active hospital problems. *      Plan:   - Closed Reduction of R shoulder successful, see procedure note   -  Sling, ice, NWB, CT of shoulder pending   -  Follow up with Dr. Good Quiroga this week       Dr. Ashish Hall aware and agrees with plan as above.         Denise Lyn, NP  Orthopedic Nurse Practitioner   [place]

## 2021-03-31 NOTE — ED NOTES
Patient has been satting at 100% on the 2 liters and is normally not on oxygen at home. Patient removed from oxygen. Will recheck in about 15 minutes to see how his oxygen is holding up.

## 2021-05-07 ENCOUNTER — OFFICE VISIT (OUTPATIENT)
Dept: NEUROLOGY | Age: 26
End: 2021-05-07
Payer: COMMERCIAL

## 2021-05-07 VITALS
BODY MASS INDEX: 24.52 KG/M2 | DIASTOLIC BLOOD PRESSURE: 80 MMHG | SYSTOLIC BLOOD PRESSURE: 122 MMHG | RESPIRATION RATE: 16 BRPM | HEIGHT: 73 IN | WEIGHT: 185 LBS | HEART RATE: 74 BPM | OXYGEN SATURATION: 97 %

## 2021-05-07 DIAGNOSIS — G40.309 NONINTRACTABLE GENERALIZED IDIOPATHIC EPILEPSY WITHOUT STATUS EPILEPTICUS (HCC): Primary | ICD-10-CM

## 2021-05-07 PROCEDURE — 99204 OFFICE O/P NEW MOD 45 MIN: CPT | Performed by: PSYCHIATRY & NEUROLOGY

## 2021-05-07 NOTE — PATIENT INSTRUCTIONS
PRESCRIPTION REFILL POLICY [facility] Statement to Patients April 1, 2014 In an effort to ensure the large volume of patient prescription refills is processed in the most efficient and expeditious manner, we are asking our patients to assist us by calling your Pharmacy for all prescription refills, this will include also your  Mail Order Pharmacy. The pharmacy will contact our office electronically to continue the refill process. Please do not wait until the last minute to call your pharmacy. We need at least 48 hours (2days) to fill prescriptions. We also encourage you to call your pharmacy before going to  your prescription to make sure it is ready. With regard to controlled substance prescription refill requests (narcotic refills) that need to be picked up at our office, we ask your cooperation by providing us with at least 72 hours (3days) notice that you will need a refill. We will not refill narcotic prescription refill requests after 4:00pm on any weekday, Monday through Thursday, or after 2:00pm on Fridays, or on the weekends. We encourage everyone to explore another way of getting your prescription refill request processed using [employer], our patient web portal through our electronic medical record system. [employer] is an efficient and effective way to communicate your medication request directly to the office and  downloadable as an ravindra on your smart phone . [employer] also features a review functionality that allows you to view your medication list as well as leave messages for your physician. Are you ready to get connected? If so please review the attatched instructions or speak to any of our staff to get you set up right away! Thank you so much for your cooperation. Should you have any questions please contact our Practice Administrator. The Physicians and Staff,  [facility]

## 2021-05-07 NOTE — PROGRESS NOTES
Mr. Allyssa Mendoza presents as a new patient for evaluation of seizure disorder. His last seizure resulted in a fractured arm requiring surgery. Depression screening done on this patient.

## 2021-05-07 NOTE — PROGRESS NOTES
Chief Complaint   Patient presents with    Seizure       HISTORY OF PRESENT ILLNESS  Francisco Wiggins is a 23 y.o. male who was following up with pediatric neurology until recently. He was experiencing  occasional myoclonic twitching in his extremities and his EEG showed generalized spike and wave pattern. 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 He was at home and abruptly went into a generalized convulsion and then had another one within a few minutes. There was no tongue bite but he was experiencing pain in his dislocated shoulder and fracture of humerus bone that required internal fixation. He was postictal when he arrived to the emergency room gradually cleared up. As per ER records, he had used one THC gummy earlier in the day. 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 He tells me that the level was therapeutic a few weeks prior. Past Medical History:   Diagnosis Date    Seizures (HCC)      Current Outpatient Medications   Medication Sig    divalproex ER (DEPAKOTE ER) 500 mg ER tablet Take 500 mg by mouth two (2) times a day.  multivitamin (ONE A DAY) tablet Take 1 Tab by mouth daily. No current facility-administered medications for this visit.       No Known Allergies  Family History   Problem Relation Age of Onset    Hypertension Maternal Grandmother     Hypertension Paternal Grandmother     Cancer Paternal Grandfather         colon cancer    Headache Mother     Hypertension Father     Cancer Father         liver and colon cancer in his 46s    Thyroid Disease Sister     Cancer Paternal Uncle         pancreatic     Social History     Tobacco Use    Smoking status: Never Smoker    Smokeless tobacco: Never Used   Substance Use Topics    Alcohol use: No    Drug use: No     History reviewed. No pertinent surgical history. REVIEW OF SYSTEMS  Review of Systems - History obtained from the patient  Psychological ROS: negative  ENT ROS: negative  Hematological and Lymphatic ROS: negative  Endocrine ROS: negative  Respiratory ROS: no cough, shortness of breath, or wheezing  Cardiovascular ROS: no chest pain or dyspnea on exertion  Gastrointestinal ROS: no abdominal pain, change in bowel habits, or black or bloody stools  Genito-Urinary ROS: no dysuria, trouble voiding, or hematuria  Musculoskeletal ROS: negative  Dermatological ROS: negative      PHYSICAL EXAMINATION:    Visit Vitals  /80   Pulse 74   Resp 16   Ht 6' 1\" (1.854 m)   Wt 185 lb (83.9 kg)   SpO2 97%   BMI 24.41 kg/m²     General:  Well nourished and groomed individual in no acute distress. Neck: Supple, nontender, no bruits, no pain with resistance to active range of motion. Heart: Regular rate and rhythm. Normal S1S2. Lungs:  Equal chest expansion, no cough, no wheeze  Musculoskeletal:  Extremities revealed no edema and had full range of motion of joints. Psych:  Good mood and bright affect    NEUROLOGICAL EXAMINATION:     Mental Status:   Alert and oriented to person, place, and time with recent and remote memory intact. Attention span and concentration are normal. Speech is fluent. Cranial Nerves:    II, III, IV, VI:  Visual acuity grossly intact. Visual fields are normal.    Pupils are equal, round, and reactive to light and accommodation. Extra-ocular movements are full and fluid. Fundoscopic exam was benign, no ptosis or nystagmus. V-XII: Hearing is grossly intact. Facial features are symmetric, with normal sensation and strength.   The palate rises symmetrically and the tongue protrudes midline. Sternocleidomastoids 5/5. Motor Examination: Normal tone, bulk, and strength. 5/5 muscle strength throughout. No cogwheel rigidity or clonus present. Sensory exam:  Normal throughout to pinprick, temperature, and vibration sense. Normal proprioception. Coordination:  Finger to nose and rapid arm movement testing was normal.   No resting or intention tremor    Gait and Station:  Steady while walking on toes, heels, and with tandem walking. Normal arm swing. No Rhomberg or pronator drift. No muscle wasting or fasiculations noted. Reflexes:  DTRs 2+ throughout. Toes downgoing. LABS / IMAGING  Lab Results   Component Value Date/Time    WBC 15.0 (H) 03/30/2021 10:41 PM    Hemoglobin (POC) 14.5 07/11/2016 02:40 PM    HGB 13.9 03/30/2021 10:41 PM    Hematocrit (POC) 48% 06/19/2014 03:18 PM    HCT 43.5 03/30/2021 10:41 PM    PLATELET 461 52/91/9320 10:41 PM    MCV 81.8 03/30/2021 10:41 PM     Lab Results   Component Value Date/Time    Sodium 140 03/30/2021 10:41 PM    Potassium 4.3 03/30/2021 10:41 PM    Chloride 106 03/30/2021 10:41 PM    CO2 17 (L) 03/30/2021 10:41 PM    Anion gap 17 (H) 03/30/2021 10:41 PM    Glucose 168 (H) 03/30/2021 10:41 PM    BUN 12 03/30/2021 10:41 PM    Creatinine 1.63 (H) 03/30/2021 10:41 PM    BUN/Creatinine ratio 7 (L) 03/30/2021 10:41 PM    GFR est AA >60 03/30/2021 10:41 PM    GFR est non-AA 53 (L) 03/30/2021 10:41 PM    Calcium 8.7 03/30/2021 10:41 PM    Bilirubin, total 0.2 03/30/2021 10:41 PM    Alk. phosphatase 104 03/30/2021 10:41 PM    Protein, total 6.9 03/30/2021 10:41 PM    Albumin 3.8 03/30/2021 10:41 PM    Globulin 3.1 03/30/2021 10:41 PM    A-G Ratio 1.2 03/30/2021 10:41 PM    ALT (SGPT) 37 03/30/2021 10:41 PM    AST (SGOT) 20 03/30/2021 10:41 PM     Lab Results   Component Value Date/Time    Valproic acid 15 (L) 03/30/2021 10:41 PM     His last EEG in 2013 showed 3 Hz spike and wave bursts    ASSESSMENT    ICD-10-CM ICD-9-CM    1. Nonintractable generalized idiopathic epilepsy without status epilepticus ([facility] [address]  G40.309 345.90 EEG      VALPROIC ACID       DISCUSSION  Mr. Luz Scruggs has idiopathic generalized epilepsy by history and his EEG showed 3 Hz spike and wave bursts in 2013. 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 He had only experienced rare myoclonic twitches in his extremities until about a month ago when he had his first generalized tonic-clonic type convulsion. His Depakote level is subtherapeutic. I have recommended repeating a level as he reports that his level was therapeutic few weeks prior. We will make adjustments to the dose depending upon the level. He does admit to feeling very drowsy with his daytime dose of Depakote. We may consider using 500 mg in the morning and 1000 mg at night.    Repeat EEG  We discussed potential seizure triggers including sleep deprivation, excessive stress, substance use he should try to avoid those  We also reviewed the state law of no driving for 6 months after seizure  Follow periodically    Artjhony Waldron MD  Diplomate, American Board of Psychiatry & Neurology (Neurology)  [place] Board of Psychiatry & Neurology (Clinical Neurophysiology)  Diplomate, American Board of Electrodiagnostic Medicine

## 2021-05-08 LAB — VALPROATE SERPL-MCNC: 61 UG/ML (ref 50–100)

## 2021-05-18 ENCOUNTER — TELEPHONE (OUTPATIENT)
Dept: NEUROLOGY | Age: 26
End: 2021-05-18

## 2021-05-18 NOTE — TELEPHONE ENCOUNTER
Patient wants to know when he should have his EEG, should he schedule it close to his next appt or go ahead and do it now?   Please advise

## 2021-07-21 ENCOUNTER — HOSPITAL ENCOUNTER (OUTPATIENT)
Dept: NEUROLOGY | Age: 26
Discharge: HOME | End: 2021-07-21
Attending: PSYCHIATRY & NEUROLOGY
Payer: COMMERCIAL

## 2021-07-21 DIAGNOSIS — G40.309 NONINTRACTABLE GENERALIZED IDIOPATHIC EPILEPSY WITHOUT STATUS EPILEPTICUS (HCC): ICD-10-CM

## 2021-07-21 PROCEDURE — 95816 EEG AWAKE AND DROWSY: CPT

## 2021-07-21 PROCEDURE — 95816 EEG AWAKE AND DROWSY: CPT | Performed by: PSYCHIATRY & NEUROLOGY

## 2021-07-22 NOTE — PROCEDURES
[address]   EEG    Name:  Gifty Sung  MR#:  [id number]  :  1995  ACCOUNT #:  [de-identified]  DATE OF SERVICE:  2021    REQUESTING PHYSICIAN:  Héctor Wetzel MD    HISTORY:  The patient is a 82-year-old male who is being evaluated for history of idiopathic generalized epilepsy. DESCRIPTION:  This is an 18-channel EEG performed on an awake patient. The dominant posterior background rhythm consists of symmetric, very well-modulated medium voltage rhythms in the 9-10 Hz frequency range. Mixture of slower and alpha frequencies were seen in the frontal areas. During this recording, there was one instance of generalized spike and slow wave discharge at a frequency of approximately 3-4 per second lasting 3-4 seconds. This occurred during hyperventilation. Photic stimulation elicits a symmetric driving response. Hyperventilation produced the above-mentioned abnormality. EEG SUMMARY:  Abnormal EEG due to brief bursts of generalized spike and slow wave discharge at a frequency of 3-4 Hz seen during hyperventilation. CLINICAL INTERPRETATION:  This patient likely has an underlying idiopathic or atypical absence epilepsy. No electrographic seizures were recorded. Please correlate clinically.       Ant Zapata MD      [id number]  D:  2021 15:45  T:  2021 16:38  JOB #:  [id number]

## 2021-07-23 NOTE — PROGRESS NOTES
Please inform that that his EEG showed absence type epilepsy and he should continue Depakote. Level was therapeutic.

## 2021-11-12 ENCOUNTER — OFFICE VISIT (OUTPATIENT)
Dept: NEUROLOGY | Age: 26
End: 2021-11-12
Payer: COMMERCIAL

## 2021-11-12 VITALS
HEIGHT: 73 IN | DIASTOLIC BLOOD PRESSURE: 80 MMHG | BODY MASS INDEX: 24.52 KG/M2 | WEIGHT: 185 LBS | HEART RATE: 82 BPM | SYSTOLIC BLOOD PRESSURE: 132 MMHG | RESPIRATION RATE: 18 BRPM | OXYGEN SATURATION: 97 %

## 2021-11-12 DIAGNOSIS — R56.9 SEIZURE (HCC): ICD-10-CM

## 2021-11-12 DIAGNOSIS — G40.309 NONINTRACTABLE GENERALIZED IDIOPATHIC EPILEPSY WITHOUT STATUS EPILEPTICUS (HCC): Primary | ICD-10-CM

## 2021-11-12 DIAGNOSIS — R79.89 BLOOD CREATININE INCREASED COMPARED WITH PRIOR MEASUREMENT: ICD-10-CM

## 2021-11-12 PROCEDURE — 99214 OFFICE O/P EST MOD 30 MIN: CPT | Performed by: PSYCHIATRY & NEUROLOGY

## 2021-11-12 RX ORDER — DIVALPROEX SODIUM 500 MG/1
500 TABLET, EXTENDED RELEASE ORAL 2 TIMES DAILY
Qty: 180 TABLET | Refills: 3 | Status: SHIPPED | OUTPATIENT
Start: 2021-11-12

## 2021-11-12 NOTE — PROGRESS NOTES
Mr. Marielle Garrett presents today to follow up seizures. He denies any recent seizure activity. Depression screening done on patient.

## 2021-11-12 NOTE — PROGRESS NOTES
Chief Complaint   Patient presents with    Seizure       HISTORY OF PRESENT ILLNESS  Debbi Ortega came back for a follow-up. He is doing well. No further seizures. Tolerating Depakote well. No side effects reported. His EEG showed 3 to 4/s spike wave discharges hyperventilation indicating atypical absence epilepsy. Denies any muscle twitches that he was experiencing before. RECAP  He was following up with pediatric neurology until recently. He was experiencing  occasional myoclonic twitching in his extremities and his EEG showed generalized spike and wave pattern. 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 He was at home and abruptly went into a generalized convulsion and then had another one within a few minutes. There was no tongue bite but he was experiencing pain in his dislocated shoulder and fracture of humerus bone that required internal fixation. He was postictal when he arrived to the emergency room gradually cleared up. As per ER records, he had used one THC gummy earlier in the day. 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 He tells me that the level was therapeutic a few weeks prior. Current Outpatient Medications   Medication Sig    divalproex ER (DEPAKOTE ER) 500 mg ER tablet Take 1 Tablet by mouth two (2) times a day.  multivitamin (ONE A DAY) tablet Take 1 Tab by mouth daily. No current facility-administered medications for this visit.        PHYSICAL EXAMINATION:    Visit Vitals  /80   Pulse 82   Resp 18   Ht 6' 1\" (1.854 m)   Wt 185 lb (83.9 kg)   SpO2 97% BMI 24.41 kg/m²       NEUROLOGICAL EXAMINATION:     Mental Status:   Alert and oriented to person, place, and time. Attention span and concentration are normal. Speech is fluent . Cranial Nerves:    II, III, IV, VI:  Visual acuity grossly intact. Visual fields are normal.    Pupils are equal, round, and reactive. Extra-ocular movements are full and fluid. V-XII: Hearing is grossly intact. Facial features are symmetric, with normal sensation and strength. The palate rises symmetrically and the tongue protrudes midline. Motor Examination: Normal tone, bulk, and strength. Sensory exam:  Normal throughout     Coordination:  Finger to nose and rapid arm movement testing was normal.   No resting or intention tremor    Gait and Station:  Steady. Normal arm swing. No muscle wasting or fasiculations noted. LABS / IMAGING  Lab Results   Component Value Date/Time    WBC 15.0 (H) 03/30/2021 10:41 PM    Hemoglobin (POC) 14.5 07/11/2016 02:40 PM    HGB 13.9 03/30/2021 10:41 PM    Hematocrit (POC) 48% 06/19/2014 03:18 PM    HCT 43.5 03/30/2021 10:41 PM    PLATELET 743 70/77/8141 10:41 PM    MCV 81.8 03/30/2021 10:41 PM     Lab Results   Component Value Date/Time    Sodium 140 03/30/2021 10:41 PM    Potassium 4.3 03/30/2021 10:41 PM    Chloride 106 03/30/2021 10:41 PM    CO2 17 (L) 03/30/2021 10:41 PM    Anion gap 17 (H) 03/30/2021 10:41 PM    Glucose 168 (H) 03/30/2021 10:41 PM    BUN 12 03/30/2021 10:41 PM    Creatinine 1.63 (H) 03/30/2021 10:41 PM    BUN/Creatinine ratio 7 (L) 03/30/2021 10:41 PM    GFR est AA >60 03/30/2021 10:41 PM    GFR est non-AA 53 (L) 03/30/2021 10:41 PM    Calcium 8.7 03/30/2021 10:41 PM    Bilirubin, total 0.2 03/30/2021 10:41 PM    Alk.  phosphatase 104 03/30/2021 10:41 PM    Protein, total 6.9 03/30/2021 10:41 PM    Albumin 3.8 03/30/2021 10:41 PM    Globulin 3.1 03/30/2021 10:41 PM    A-G Ratio 1.2 03/30/2021 10:41 PM    ALT (SGPT) 37 03/30/2021 10:41 PM    AST (SGOT) 20 03/30/2021 10:41 PM     Lab Results   Component Value Date/Time    Valproic acid 61 05/07/2021 09:39 AM     His last EEG in 2013 showed 3-4 Hz spike and wave bursts    Lab Results   Component Value Date/Time    Valproic acid 61 05/07/2021 09:39 AM       ASSESSMENT    ICD-10-CM ICD-9-CM    1. Nonintractable generalized idiopathic epilepsy without status epilepticus ([facility] [address]  G40.309 345.90 divalproex ER (DEPAKOTE ER) 500 mg ER tablet   2. Blood creatinine increased compared with prior measurement  R79.89 790.99    3.  Seizure [facility])  R56.9 780.39        DISCUSSION  Mr. 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  He is doing well on Depakote 500 mg twice daily and his most recent level was therapeutic  Tolerating it well and denies any side effects  Medication was refilled for the next year    His last lab work did show declinie in his creatinine and he was encouraged to make a follow-up appointment with his PCP    We again reviewed potential seizure triggers including sleep deprivation, excessive stress, substance use he should try to avoid those    Follow-up annually or earlier if needed    Pili Andrew MD  Diplomate, American Board of Psychiatry & Neurology (Neurology)  Diplomate, American Board of Psychiatry & Neurology (Clinical Neurophysiology)  Diplomate, American Board of Electrodiagnostic Medicine

## 2022-01-14 ENCOUNTER — OFFICE VISIT (OUTPATIENT)
Dept: FAMILY MEDICINE CLINIC | Age: 27
End: 2022-01-14
Payer: COMMERCIAL

## 2022-01-14 VITALS
HEIGHT: 73 IN | BODY MASS INDEX: 27.73 KG/M2 | WEIGHT: 209.2 LBS | SYSTOLIC BLOOD PRESSURE: 136 MMHG | DIASTOLIC BLOOD PRESSURE: 82 MMHG | TEMPERATURE: 97.9 F | OXYGEN SATURATION: 98 % | RESPIRATION RATE: 16 BRPM | HEART RATE: 87 BPM

## 2022-01-14 DIAGNOSIS — Z13.220 SCREENING, LIPID: ICD-10-CM

## 2022-01-14 DIAGNOSIS — Z20.2 CHLAMYDIA CONTACT: ICD-10-CM

## 2022-01-14 DIAGNOSIS — Z86.69 HISTORY OF SEIZURE DISORDER: ICD-10-CM

## 2022-01-14 DIAGNOSIS — Z00.00 ROUTINE GENERAL MEDICAL EXAMINATION AT A HEALTH CARE FACILITY: Primary | ICD-10-CM

## 2022-01-14 DIAGNOSIS — Z23 ENCOUNTER FOR ADMINISTRATION OF COVID-19 VACCINE: ICD-10-CM

## 2022-01-14 DIAGNOSIS — Z11.4 SCREENING FOR HIV WITHOUT PRESENCE OF RISK FACTORS: ICD-10-CM

## 2022-01-14 DIAGNOSIS — Z11.59 NEED FOR HEPATITIS C SCREENING TEST: ICD-10-CM

## 2022-01-14 PROCEDURE — 99395 PREV VISIT EST AGE 18-39: CPT | Performed by: FAMILY MEDICINE

## 2022-01-14 PROCEDURE — 91300 COVID-19, MRNA, LNP-S, PF, 30MCG/0.3ML DOSE(PFIZER): CPT | Performed by: FAMILY MEDICINE

## 2022-01-14 PROCEDURE — 0003A COVID-19, MRNA, LNP-S, PF, 30MCG/0.3ML DOSE(PFIZER): CPT | Performed by: FAMILY MEDICINE

## 2022-01-14 RX ORDER — AZITHROMYCIN 250 MG/1
1000 TABLET, FILM COATED ORAL
Qty: 4 TABLET | Refills: 0 | Status: SHIPPED | OUTPATIENT
Start: 2022-01-14 | End: 2022-01-14

## 2022-01-14 RX ORDER — AZITHROMYCIN 250 MG/1
1000 TABLET, FILM COATED ORAL
Qty: 4 TABLET | Refills: 0 | Status: SHIPPED | OUTPATIENT
Start: 2022-01-14 | End: 2022-01-14 | Stop reason: SDUPTHER

## 2022-01-14 NOTE — PATIENT INSTRUCTIONS
Testicular Self-Exam: Care Instructions  Your Care Instructions     A self-exam is a way for you to check for cancer of the testicles. Although testicular cancer is rare, it is one of the most common tumors in men younger than 28. Many testicular cancers are found during self-exam. In the early stages of testicular cancer, the lump, which may be about the size of a pea, usually is not painful. Testicular cancer, especially if treated early, is very often cured. By doing this self-exam regularly, you can learn the normal size, shape, and weight of your testicles. This allows you to note any changes. Follow-up care is a key part of your treatment and safety. Be sure to make and go to all appointments, and call your doctor if you are having problems. It's also a good idea to know your test results and keep a list of the medicines you take. How is it done? · The self-exam is best done during or after a bath or shower--when the scrotum, the skin sac that holds the testicles, is relaxed. · Stand and place your right leg on a raised surface about chair height. Then gently feel your scrotum until you find the right testicle. · Roll the testicle gently but firmly between your thumb and fingers of both hands. Carefully feel the surface for lumps. Feel for any change in the size, shape, or texture of the testicle. The testicle should feel round and smooth. It is normal for one testicle to be slightly larger than the other one. · Repeat this for the left side. Feel the entire surface of both testicles. · You may feel the epididymis, the soft tube behind each testicle. Get familiar with how it feels so that you won't mistake it for a lump. When should you call for help? Call your doctor now or seek immediate medical care if:    · You have pain in a testicle.    Watch closely for changes in your health, and be sure to contact your doctor if:    · You notice a change in a testicle.     · You notice a lump in a testicle. Where can you learn more? Go to http://www.gray.com/  Enter Z713 in the search box to learn more about \"Testicular Self-Exam: Care Instructions. \"  Current as of: February 10, 2021               Content Version: 13.0  © 6668-8513 [employer]. Care instructions adapted under license by [employer] (which disclaims liability or warranty for this information). If you have questions about a medical condition or this instruction, always ask your healthcare professional. [address] any warranty or liability for your use of this information.

## 2022-01-14 NOTE — PROGRESS NOTES
HISTORY OF PRESENT ILLNESS  Tammi Garrido is a 27 y.o. male. HPI   For CPE. Overall feeling well. Doing the precautionary measures at home to reduce risks of exposure COVID19. Also wearing mask when he is going out. No known sick contacts or known exposure to [address]. Has hx of seizure disorder since age 17. He has been stable on Depakote. Followed by neurology(dr. Alexander). Last seizure was at the end of March 2021. Episode cause fracture of the right shoulder requiring surgery. Also wants STI screening. His previous girlfriend was positive for chlamydia. Pt has no penile discharge. No previous hx of STI. Patient Active Problem List   Diagnosis Code    Seizure ([address] R56.9    Seizures ([address] R56.9       Current Outpatient Medications   Medication Sig Dispense Refill    divalproex ER (DEPAKOTE ER) 500 mg ER tablet Take 1 Tablet by mouth two (2) times a day. 180 Tablet 3    multivitamin (ONE A DAY) tablet Take 1 Tab by mouth daily. No Known Allergies      Past Medical History:   Diagnosis Date    Seizures ([address]        No past surgical history on file.       Family History   Problem Relation Age of Onset    Hypertension Maternal Grandmother     Hypertension Paternal Grandmother     Cancer Paternal Grandfather         colon cancer    Headache Mother     Hypertension Father     Cancer Father         liver and colon cancer in his 46s    Thyroid Disease Sister     Cancer Paternal Uncle         pancreatic       Social History     Tobacco Use    Smoking status: Never Smoker    Smokeless tobacco: Never Used   Substance Use Topics    Alcohol use: No        Lab Results   Component Value Date/Time    WBC 15.0 (H) 03/30/2021 10:41 PM    HGB 13.9 03/30/2021 10:41 PM    Hemoglobin (POC) 14.5 07/11/2016 02:40 PM    HCT 43.5 03/30/2021 10:41 PM    Hematocrit (POC) 48% 06/19/2014 03:18 PM    PLATELET 719 42/60/8906 10:41 PM    MCV 81.8 03/30/2021 10:41 PM     Lab Results   Component Value Date/Time    Cholesterol, total 187 09/08/2020 11:00 AM    HDL Cholesterol 37 (L) 09/08/2020 11:00 AM    LDL, calculated 125 (H) 09/08/2020 11:00 AM    LDL, calculated 104 07/12/2017 12:50 PM    Triglyceride 139 09/08/2020 11:00 AM     Lab Results   Component Value Date/Time    Sodium 140 03/30/2021 10:41 PM    Potassium 4.3 03/30/2021 10:41 PM    Chloride 106 03/30/2021 10:41 PM    CO2 17 (L) 03/30/2021 10:41 PM    Anion gap 17 (H) 03/30/2021 10:41 PM    Glucose 168 (H) 03/30/2021 10:41 PM    BUN 12 03/30/2021 10:41 PM    Creatinine 1.63 (H) 03/30/2021 10:41 PM    BUN/Creatinine ratio 7 (L) 03/30/2021 10:41 PM    GFR est AA >60 03/30/2021 10:41 PM    GFR est non-AA 53 (L) 03/30/2021 10:41 PM    Calcium 8.7 03/30/2021 10:41 PM    Bilirubin, total 0.2 03/30/2021 10:41 PM    ALT (SGPT) 37 03/30/2021 10:41 PM    Alk. phosphatase 104 03/30/2021 10:41 PM    Protein, total 6.9 03/30/2021 10:41 PM    Albumin 3.8 03/30/2021 10:41 PM    Globulin 3.1 03/30/2021 10:41 PM    A-G Ratio 1.2 03/30/2021 10:41 PM         Review of Systems   Constitutional: Negative for malaise/fatigue. HENT: Negative for congestion. Eyes: Negative for blurred vision. Respiratory: Negative for cough and shortness of breath. Cardiovascular: Negative for chest pain, palpitations and leg swelling. Gastrointestinal: Negative for abdominal pain, constipation and heartburn. Genitourinary: Negative for dysuria, frequency and urgency. Musculoskeletal: Negative for back pain and joint pain. Neurological: Negative for dizziness, tingling and headaches. Endo/Heme/Allergies: Negative for environmental allergies. Psychiatric/Behavioral: Negative for depression. The patient does not have insomnia. Physical Exam  Vitals and nursing note reviewed. Constitutional:       Appearance: Normal appearance. He is well-developed.       Comments: /82 (BP 1 Location: Left arm, BP Patient Position: Sitting)   Pulse 87   Temp 97.9 °F (36.6 °C) (Oral)   Resp 16   Ht 6' 1\" (1.854 m)   Wt 209 lb 3.2 oz (94.9 kg)   SpO2 98%   BMI 27.60 kg/m²       HENT:      Right Ear: Tympanic membrane and ear canal normal.      Left Ear: Tympanic membrane and ear canal normal.      Nose: No mucosal edema. Neck:      Thyroid: No thyromegaly. Cardiovascular:      Rate and Rhythm: Normal rate and regular rhythm. Heart sounds: Normal heart sounds. Pulmonary:      Effort: Pulmonary effort is normal.      Breath sounds: Normal breath sounds. No wheezing, rhonchi or rales. Abdominal:      General: Bowel sounds are normal.      Palpations: Abdomen is soft. There is no mass. Tenderness: There is no abdominal tenderness. Musculoskeletal:         General: No swelling. Normal range of motion. Cervical back: Normal range of motion and neck supple. Right lower leg: No edema. Left lower leg: No edema. Lymphadenopathy:      Cervical: No cervical adenopathy. Skin:     General: Skin is warm and dry. Neurological:      General: No focal deficit present. Mental Status: He is alert and oriented to person, place, and time. Psychiatric:         Mood and Affect: Mood normal.         ASSESSMENT and PLAN  Diagnoses and all orders for this visit:    1. Routine general medical examination at a health care facility  -     METABOLIC PANEL, COMPREHENSIVE; Future  -     CBC W/O DIFF; Future    2. History of seizure disorder  As per neurology    3. Chlamydia contact  Discussed STI prevention.    -     Buddy Aguiar / GC-AMPLIFIED; Future  -     URINALYSIS W/ REFLEX CULTURE; Future  -     azithromycin (ZITHROMAX) 250 mg tablet; Take 4 Tablets by mouth now for 1 dose. 4. Screening, lipid  -     LIPID PANEL; Future    5. Screening for HIV without presence of risk factors  -     HIV 1/2 AG/AB, 4TH GENERATION,W RFLX CONFIRM; Future    6. Need for hepatitis C screening test  -     HEPATITIS C AB; Future    7.  Encounter for administration of COVID-19 vaccine  -     COVID-19, MRNA, LNP-S, PF, 30MCG/0.3ML DOSE(PFIZER)      Follow-up and Dispositions    · Return in about 6 months (around 7/14/2022). reviewed diet, exercise and weight control  cardiovascular risk and specific lipid/LDL goals reviewed  reviewed medications and side effects in detail    I have discussed diagnosis listed in this note with pt and/or family. I have discussed treatment plans and options and the risk/benefit analysis of those options, including safe use of medications and possible medication side effects. Through the use of shared decision making we have agreed to the above plan. The patient has received an after-visit summary and questions were answered concerning future plans and follow up. Advise pt of any urgent changes then to proceed to the ER.

## 2022-01-14 NOTE — PROGRESS NOTES
Chief Complaint   Patient presents with    Complete Physical       Verbal Order with Readback given by Ashleigh Mejia MD for Pfizer COVID-19 Booster, 0.3 mL. Given in left deltoid without difficulty. Pt monitored for 15 minutes with no reaction. 1. Have you been to the ER, urgent care clinic since your last visit? Hospitalized since your last visit? Yes, [facility] 12/27/2021 tested positive due to exposure to COVID- pt had no symptoms    2. Have you seen or consulted any other health care providers outside of the [address] [place] [address] since your last visit? Include any pap smears or colon screening.  no

## 2022-01-15 LAB
ALBUMIN SERPL-MCNC: 4.2 G/DL (ref 3.5–5)
ALBUMIN/GLOB SERPL: 1.2 {RATIO} (ref 1.1–2.2)
ALP SERPL-CCNC: 77 U/L (ref 45–117)
ALT SERPL-CCNC: 45 U/L (ref 12–78)
ANION GAP SERPL CALC-SCNC: 6 MMOL/L (ref 5–15)
APPEARANCE UR: ABNORMAL
AST SERPL-CCNC: 23 U/L (ref 15–37)
BACTERIA URNS QL MICRO: NEGATIVE /HPF
BILIRUB SERPL-MCNC: 0.4 MG/DL (ref 0.2–1)
BILIRUB UR QL: NEGATIVE
BUN SERPL-MCNC: 12 MG/DL (ref 6–20)
BUN/CREAT SERPL: 12 (ref 12–20)
CALCIUM SERPL-MCNC: 9.5 MG/DL (ref 8.5–10.1)
CHLORIDE SERPL-SCNC: 109 MMOL/L (ref 97–108)
CHOLEST SERPL-MCNC: 185 MG/DL
CO2 SERPL-SCNC: 27 MMOL/L (ref 21–32)
COLOR UR: ABNORMAL
CREAT SERPL-MCNC: 1.02 MG/DL (ref 0.7–1.3)
EPITH CASTS URNS QL MICRO: ABNORMAL /LPF
ERYTHROCYTE [DISTWIDTH] IN BLOOD BY AUTOMATED COUNT: 14.4 % (ref 11.5–14.5)
GLOBULIN SER CALC-MCNC: 3.5 G/DL (ref 2–4)
GLUCOSE SERPL-MCNC: 104 MG/DL (ref 65–100)
GLUCOSE UR STRIP.AUTO-MCNC: NEGATIVE MG/DL
HCT VFR BLD AUTO: 46.5 % (ref 36.6–50.3)
HCV AB SERPL QL IA: NONREACTIVE
HDLC SERPL-MCNC: 27 MG/DL
HDLC SERPL: 6.9 {RATIO} (ref 0–5)
HGB BLD-MCNC: 14.7 G/DL (ref 12.1–17)
HGB UR QL STRIP: NEGATIVE
HIV 1+2 AB+HIV1 P24 AG SERPL QL IA: NONREACTIVE
HIV12 RESULT COMMENT, HHIVC: NORMAL
KETONES UR QL STRIP.AUTO: ABNORMAL MG/DL
LDLC SERPL CALC-MCNC: 112.4 MG/DL (ref 0–100)
LEUKOCYTE ESTERASE UR QL STRIP.AUTO: NEGATIVE
MCH RBC QN AUTO: 26.3 PG (ref 26–34)
MCHC RBC AUTO-ENTMCNC: 31.6 G/DL (ref 30–36.5)
MCV RBC AUTO: 83.3 FL (ref 80–99)
NITRITE UR QL STRIP.AUTO: NEGATIVE
NRBC # BLD: 0 K/UL (ref 0–0.01)
NRBC BLD-RTO: 0 PER 100 WBC
PH UR STRIP: 8.5 [PH] (ref 5–8)
PLATELET # BLD AUTO: 262 K/UL (ref 150–400)
PMV BLD AUTO: 10.9 FL (ref 8.9–12.9)
POTASSIUM SERPL-SCNC: 3.6 MMOL/L (ref 3.5–5.1)
PROT SERPL-MCNC: 7.7 G/DL (ref 6.4–8.2)
PROT UR STRIP-MCNC: NEGATIVE MG/DL
RBC # BLD AUTO: 5.58 M/UL (ref 4.1–5.7)
RBC #/AREA URNS HPF: ABNORMAL /HPF (ref 0–5)
SODIUM SERPL-SCNC: 142 MMOL/L (ref 136–145)
SP GR UR REFRACTOMETRY: 1.02 (ref 1–1.03)
TRIGL SERPL-MCNC: 228 MG/DL (ref ?–150)
UA: UC IF INDICATED,UAUC: ABNORMAL
UROBILINOGEN UR QL STRIP.AUTO: 1 EU/DL (ref 0.2–1)
VLDLC SERPL CALC-MCNC: 45.6 MG/DL
WBC # BLD AUTO: 6.2 K/UL (ref 4.1–11.1)
WBC URNS QL MICRO: ABNORMAL /HPF (ref 0–4)

## 2022-01-19 LAB
C TRACH RRNA SPEC QL NAA+PROBE: POSITIVE
N GONORRHOEA RRNA SPEC QL NAA+PROBE: NEGATIVE
SPECIMEN SOURCE: ABNORMAL

## 2022-01-20 ENCOUNTER — TELEPHONE (OUTPATIENT)
Dept: FAMILY MEDICINE CLINIC | Age: 27
End: 2022-01-20

## 2022-01-20 NOTE — TELEPHONE ENCOUNTER
Pt called. Informed of positive chlamydia. He has completed treated. Advised to use appropriate protection to help prevent STI.

## 2022-11-11 ENCOUNTER — OFFICE VISIT (OUTPATIENT)
Dept: NEUROLOGY | Age: 27
End: 2022-11-11

## 2022-11-11 VITALS
OXYGEN SATURATION: 98 % | WEIGHT: 220 LBS | SYSTOLIC BLOOD PRESSURE: 130 MMHG | RESPIRATION RATE: 18 BRPM | HEART RATE: 85 BPM | DIASTOLIC BLOOD PRESSURE: 84 MMHG | BODY MASS INDEX: 29.03 KG/M2

## 2022-11-11 DIAGNOSIS — G40.309 NONINTRACTABLE GENERALIZED IDIOPATHIC EPILEPSY WITHOUT STATUS EPILEPTICUS (HCC): Primary | ICD-10-CM

## 2022-11-11 PROCEDURE — 99214 OFFICE O/P EST MOD 30 MIN: CPT | Performed by: PSYCHIATRY & NEUROLOGY

## 2022-11-11 NOTE — PROGRESS NOTES
Chief Complaint   Patient presents with    Follow-up     Doing good, denies seizure activity. HISTORY OF PRESENT ILLNESS  To [facility] came back for a follow-up. He is doing well. No further seizures. Tolerating Depakote well. No side effects reported. He has gained about 35 pounds over the last year which he attributes to his eating habits. He is trying to watch it now. 62  His EEG showed 3 to 4/s spike wave discharges hyperventilation indicating atypical absence epilepsy. Denies any muscle twitches that he was experiencing before. RECAP  He was following up with pediatric neurology until recently. He was experiencing  occasional myoclonic twitching in his extremities and his EEG showed generalized spike and wave pattern. 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 He was at home and abruptly went into a generalized convulsion and then had another one within a few minutes. There was no tongue bite but he was experiencing pain in his dislocated shoulder and fracture of humerus bone that required internal fixation. He was postictal when he arrived to the emergency room gradually cleared up. As per ER records, he had used one THC gummy earlier in the day. 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 He tells me that the level was therapeutic a few weeks prior. Current Outpatient Medications   Medication Sig    divalproex ER (DEPAKOTE ER) 500 mg ER tablet Take 1 Tablet by mouth two (2) times a day. multivitamin (ONE A DAY) tablet Take 1 Tab by mouth daily.      No current facility-administered medications for this visit. PHYSICAL EXAMINATION:    Visit Vitals  /84   Pulse 85   Resp 18   Wt 220 lb (99.8 kg)   SpO2 98%   BMI 29.03 kg/m²       NEUROLOGICAL EXAMINATION:     Mental Status:   Alert and oriented to person, place, and time. Attention span and concentration are normal. Speech is fluent . Cranial Nerves:    II, III, IV, VI:  Visual acuity grossly intact. Visual fields are normal.    Pupils are equal, round, and reactive. Extra-ocular movements are full and fluid. V-XII: Hearing is grossly intact. Facial features are symmetric, with normal sensation and strength. The palate rises symmetrically and the tongue protrudes midline. Motor Examination: Normal tone, bulk, and strength. Sensory exam:  Normal throughout     Coordination:  Finger to nose and rapid arm movement testing was normal.   No resting or intention tremor    Gait and Station:  Steady. Normal arm swing. No muscle wasting or fasiculations noted. LABS / IMAGING  Lab Results   Component Value Date/Time    WBC 6.2 01/14/2022 04:21 PM    Hemoglobin (POC) 14.5 07/11/2016 02:40 PM    HGB 14.7 01/14/2022 04:21 PM    Hematocrit (POC) 48% 06/19/2014 03:18 PM    HCT 46.5 01/14/2022 04:21 PM    PLATELET 930 05/06/2344 04:21 PM    MCV 83.3 01/14/2022 04:21 PM     Lab Results   Component Value Date/Time    Sodium 142 01/14/2022 04:21 PM    Potassium 3.6 01/14/2022 04:21 PM    Chloride 109 (H) 01/14/2022 04:21 PM    CO2 27 01/14/2022 04:21 PM    Anion gap 6 01/14/2022 04:21 PM    Glucose 104 (H) 01/14/2022 04:21 PM    BUN 12 01/14/2022 04:21 PM    Creatinine 1.02 01/14/2022 04:21 PM    BUN/Creatinine ratio 12 01/14/2022 04:21 PM    GFR est AA >60 01/14/2022 04:21 PM    GFR est non-AA >60 01/14/2022 04:21 PM    Calcium 9.5 01/14/2022 04:21 PM    Bilirubin, total 0.4 01/14/2022 04:21 PM    Alk.  phosphatase 77 01/14/2022 04:21 PM    Protein, total 7.7 01/14/2022 04:21 PM    Albumin 4.2 01/14/2022 04:21 PM    Globulin 3.5 01/14/2022 04:21 PM    A-G Ratio 1.2 01/14/2022 04:21 PM    ALT (SGPT) 45 01/14/2022 04:21 PM    AST (SGOT) 23 01/14/2022 04:21 PM     Lab Results   Component Value Date/Time    Valproic acid 61 05/07/2021 09:39 AM     His last EEG in 2013 showed 3-4 Hz spike and wave bursts      ASSESSMENT    ICD-10-CM ICD-9-CM    1.  Nonintractable generalized idiopathic epilepsy without status epilepticus ([facility]  G40.309 345.90           DISCUSSION  Mr. 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  He is doing well on Depakote 500 mg twice daily and his last level was therapeutic  Tolerating it well and denies any side effects  I will continue to refill medications    He has follow-up coming up with his PCP when he is supposed to get his routine labs checked and I have recommended adding on Depakote level as well    We again reviewed potential seizure triggers including sleep deprivation, excessive stress, substance use he should try to avoid those    Follow-up annually or earlier if needed    Juan Perry MD  Diplomate, American Board of Psychiatry & Neurology (Neurology)  [facility] Neu[facility] (Clinical Neurophysiology)  Diplomate, American Board of Electrodiagnostic Medicine

## 2023-01-12 ENCOUNTER — TELEPHONE (OUTPATIENT)
Dept: NEUROLOGY | Age: 28
End: 2023-01-12

## 2023-01-12 DIAGNOSIS — G40.309 NONINTRACTABLE GENERALIZED IDIOPATHIC EPILEPSY WITHOUT STATUS EPILEPTICUS (HCC): ICD-10-CM

## 2023-01-12 RX ORDER — DIVALPROEX SODIUM 500 MG/1
500 TABLET, EXTENDED RELEASE ORAL 2 TIMES DAILY
Qty: 180 TABLET | Refills: 3 | Status: SHIPPED | OUTPATIENT
Start: 2023-01-12

## 2023-01-12 RX ORDER — DIVALPROEX SODIUM 500 MG/1
500 TABLET, EXTENDED RELEASE ORAL 2 TIMES DAILY
Qty: 180 TABLET | Refills: 3 | Status: CANCELLED | OUTPATIENT
Start: 2023-01-12

## 2023-01-12 NOTE — TELEPHONE ENCOUNTER
Patient requesting refill on [facility]   [address] [facility] on [address]. Thank you.

## 2023-02-21 ENCOUNTER — OFFICE VISIT (OUTPATIENT)
Dept: FAMILY MEDICINE CLINIC | Age: 28
End: 2023-02-21
Payer: COMMERCIAL

## 2023-02-21 VITALS
WEIGHT: 222 LBS | TEMPERATURE: 97.9 F | BODY MASS INDEX: 29.42 KG/M2 | SYSTOLIC BLOOD PRESSURE: 138 MMHG | HEIGHT: 73 IN | OXYGEN SATURATION: 97 % | DIASTOLIC BLOOD PRESSURE: 85 MMHG | HEART RATE: 67 BPM | RESPIRATION RATE: 20 BRPM

## 2023-02-21 DIAGNOSIS — Z79.899 ENCOUNTER FOR LONG-TERM (CURRENT) USE OF MEDICATIONS: ICD-10-CM

## 2023-02-21 DIAGNOSIS — Z86.69 HISTORY OF SEIZURE DISORDER: ICD-10-CM

## 2023-02-21 DIAGNOSIS — L30.9 ECZEMA OF SCALP: ICD-10-CM

## 2023-02-21 DIAGNOSIS — Z00.00 ROUTINE GENERAL MEDICAL EXAMINATION AT A HEALTH CARE FACILITY: Primary | ICD-10-CM

## 2023-02-21 PROCEDURE — 99395 PREV VISIT EST AGE 18-39: CPT | Performed by: FAMILY MEDICINE

## 2023-02-21 RX ORDER — KETOCONAZOLE 10 MG/ML
SHAMPOO TOPICAL
COMMUNITY
Start: 2023-02-21

## 2023-02-21 RX ORDER — CLOBETASOL PROPIONATE 0.5 MG/G
OINTMENT TOPICAL
Qty: 30 G | Refills: 1 | Status: SHIPPED | OUTPATIENT
Start: 2023-02-21

## 2023-02-21 NOTE — PROGRESS NOTES
HISTORY OF PRESENT ILLNESS  Beckie Payton is a 24 y.o. male. HPI   For CPE. Overall feeling well. Increase family stress. Trying to manage the stress and has been using the CBD gummies which helps him with the anxiety caused by family issues. Lives with is grandmother and mother lives next door. Discussed seeing a therapist and pt declined  he is thinking about moving to her own place. No recent seizure. Followed by neurology. Last seen this past November. Has hx of seizure disorder since age 17. He has been stable on Depakote. Followed by neurology(Dr. Paola Allison). Last seizure was at the end of March 2021. Episode cause fracture of the right shoulder requiring surgery. Does not need STI screening today. Has one steady partner. Walks his dog for exercise. Weight is up 13 pounds in the past year. Know that he snacks too much at work and will work on reducing snacking. Also discussed increased exercise. Sleep well at night. Wear seatbelt. Does not drink. Patient Active Problem List   Diagnosis Code    Seizure ([address] R56.9    Seizures ([address] R56.9       Current Outpatient Medications   Medication Sig Dispense Refill    divalproex ER (DEPAKOTE ER) 500 mg ER tablet Take 1 Tablet by mouth two (2) times a day. 180 Tablet 3    multivitamin (ONE A DAY) tablet Take 1 Tab by mouth daily.          No Known Allergies      Past Medical History:   Diagnosis Date    Epilepsy ([address]     Seizures ([address]          Past Surgical History:   Procedure Laterality Date    HX ORTHOPAEDIC  04/2021    right shoulder             Family History   Problem Relation Age of Onset    Hypertension Maternal Grandmother     Hypertension Paternal Grandmother     Cancer Paternal Grandfather         colon cancer    Headache Mother     Hypertension Father     Cancer Father         liver and colon cancer in his 46s    Thyroid Disease Sister     Cancer Paternal Uncle         pancreatic       Social History Tobacco Use    Smoking status: Never    Smokeless tobacco: Never   Substance Use Topics    Alcohol use: No        Lab Results   Component Value Date/Time    WBC 6.2 01/14/2022 04:21 PM    HGB 14.7 01/14/2022 04:21 PM    Hemoglobin (POC) 14.5 07/11/2016 02:40 PM    HCT 46.5 01/14/2022 04:21 PM    Hematocrit (POC) 48% 06/19/2014 03:18 PM    PLATELET 387 80/93/2910 04:21 PM    MCV 83.3 01/14/2022 04:21 PM     Lab Results   Component Value Date/Time    Cholesterol, total 185 01/14/2022 04:21 PM    HDL Cholesterol 27 01/14/2022 04:21 PM    LDL, calculated 112.4 (H) 01/14/2022 04:21 PM    Triglyceride 228 (H) 01/14/2022 04:21 PM    CHOL/HDL Ratio 6.9 (H) 01/14/2022 04:21 PM     Lab Results   Component Value Date/Time    Sodium 142 01/14/2022 04:21 PM    Potassium 3.6 01/14/2022 04:21 PM    Chloride 109 (H) 01/14/2022 04:21 PM    CO2 27 01/14/2022 04:21 PM    Anion gap 6 01/14/2022 04:21 PM    Glucose 104 (H) 01/14/2022 04:21 PM    BUN 12 01/14/2022 04:21 PM    Creatinine 1.02 01/14/2022 04:21 PM    BUN/Creatinine ratio 12 01/14/2022 04:21 PM    GFR est AA >60 01/14/2022 04:21 PM    GFR est non-AA >60 01/14/2022 04:21 PM    Calcium 9.5 01/14/2022 04:21 PM    Bilirubin, total 0.4 01/14/2022 04:21 PM    ALT (SGPT) 45 01/14/2022 04:21 PM    Alk. phosphatase 77 01/14/2022 04:21 PM    Protein, total 7.7 01/14/2022 04:21 PM    Albumin 4.2 01/14/2022 04:21 PM    Globulin 3.5 01/14/2022 04:21 PM    A-G Ratio 1.2 01/14/2022 04:21 PM         Review of Systems   Constitutional:  Negative for malaise/fatigue. HENT:  Negative for congestion. Eyes:  Negative for blurred vision. Respiratory:  Negative for cough and shortness of breath. Cardiovascular:  Negative for chest pain, palpitations and leg swelling. Gastrointestinal:  Negative for abdominal pain, constipation and heartburn. Genitourinary:  Negative for dysuria, frequency and urgency. Musculoskeletal:  Negative for back pain and joint pain.    Skin: Dry flaky rash around the edges of and in the scalp. Neurological:  Negative for dizziness, tingling and headaches. Endo/Heme/Allergies:  Negative for environmental allergies. Psychiatric/Behavioral:  Negative for depression. The patient does not have insomnia. Physical Exam  Vitals and nursing note reviewed. Constitutional:       Appearance: Normal appearance. He is well-developed. Comments: /82 (BP 1 Location: Left arm, BP Patient Position: Sitting)   Pulse 87   Temp 97.9 °F (36.6 °C) (Oral)   Resp 16   Ht 6' 1\" (1.854 m)   Wt 209 lb 3.2 oz (94.9 kg)   SpO2 98%   BMI 27.60 kg/m²       HENT:      Right Ear: Tympanic membrane and ear canal normal.      Left Ear: Tympanic membrane and ear canal normal.      Nose: No mucosal edema or rhinorrhea. Neck:      Thyroid: No thyromegaly. Cardiovascular:      Rate and Rhythm: Normal rate and regular rhythm. Heart sounds: Normal heart sounds. Pulmonary:      Effort: Pulmonary effort is normal.      Breath sounds: Normal breath sounds. No wheezing, rhonchi or rales. Abdominal:      General: Bowel sounds are normal.      Palpations: Abdomen is soft. There is no mass. Tenderness: There is no abdominal tenderness. Musculoskeletal:         General: No swelling. Normal range of motion. Cervical back: Normal range of motion and neck supple. Right lower leg: No edema. Left lower leg: No edema. Lymphadenopathy:      Cervical: No cervical adenopathy. Skin:     General: Skin is warm and dry. Comments: Dry flaky rash around the edges of and in the scalp. Neurological:      General: No focal deficit present. Mental Status: He is alert and oriented to person, place, and time. Psychiatric:         Mood and Affect: Mood normal.           ASSESSMENT and PLAN  Diagnoses and all orders for this visit:    1. Routine general medical examination at a health care facility    2.  History of seizure disorder  - VALPROIC ACID; Future    3. Encounter for long-term (current) use of medications  -     METABOLIC PANEL, COMPREHENSIVE; Future  -     CBC W/O DIFF; Future    4. Scalp eczema  -     clobetasoL (TEMOVATE) 0.05 % ointment; Apply  to affected area two (2) times daily as needed for Skin Irritation or Itching. Nizoral shampoo twice weekly.           current treatment plan is effective, no change in therapy  reviewed diet, exercise and weight control  cardiovascular risk and specific lipid/LDL goals reviewed  reviewed medications and side effects in detail

## 2023-02-21 NOTE — PROGRESS NOTES
Identified pt with two pt identifiers(name and ). Reviewed record in preparation for visit and have obtained necessary documentation. Chief Complaint   Patient presents with    Annual Wellness Visit        Vitals:    23 1403   BP: 138/85   Pulse: 67   Resp: 20   Temp: 97.9 °F (36.6 °C)   TempSrc: Oral   SpO2: 97%   Weight: 222 lb (100.7 kg)   Height: 6' 1\" (1.854 m)   PainSc:   0 - No pain       Health Maintenance Due   Topic    COVID-19 Vaccine (4 - Booster for [employer] Peter series)    Flu Vaccine (1)       Coordination of Care Questionnaire:  :   1) Have you been to an emergency room, urgent care, or hospitalized since your last visit? If yes, where when, and reason for visit? no       2. Have seen or consulted any other health care provider since your last visit? If yes, where when, and reason for visit? NO      Patient is accompanied by self I have received verbal consent from Madeline Sanches to discuss any/all medical information while they are present in the room.

## 2023-02-22 LAB
ALBUMIN SERPL-MCNC: 4.4 G/DL (ref 3.5–5)
ALBUMIN/GLOB SERPL: 1.2 (ref 1.1–2.2)
ALP SERPL-CCNC: 89 U/L (ref 45–117)
ALT SERPL-CCNC: 46 U/L (ref 12–78)
ANION GAP SERPL CALC-SCNC: 5 MMOL/L (ref 5–15)
AST SERPL-CCNC: 31 U/L (ref 15–37)
BILIRUB SERPL-MCNC: 0.2 MG/DL (ref 0.2–1)
BUN SERPL-MCNC: 13 MG/DL (ref 6–20)
BUN/CREAT SERPL: 12 (ref 12–20)
CALCIUM SERPL-MCNC: 10.1 MG/DL (ref 8.5–10.1)
CHLORIDE SERPL-SCNC: 107 MMOL/L (ref 97–108)
CO2 SERPL-SCNC: 30 MMOL/L (ref 21–32)
CREAT SERPL-MCNC: 1.12 MG/DL (ref 0.7–1.3)
ERYTHROCYTE [DISTWIDTH] IN BLOOD BY AUTOMATED COUNT: 15.1 % (ref 11.5–14.5)
GLOBULIN SER CALC-MCNC: 3.6 G/DL (ref 2–4)
GLUCOSE SERPL-MCNC: 93 MG/DL (ref 65–100)
HCT VFR BLD AUTO: 49.5 % (ref 36.6–50.3)
HGB BLD-MCNC: 15.2 G/DL (ref 12.1–17)
MCH RBC QN AUTO: 25.8 PG (ref 26–34)
MCHC RBC AUTO-ENTMCNC: 30.7 G/DL (ref 30–36.5)
MCV RBC AUTO: 83.9 FL (ref 80–99)
NRBC # BLD: 0 K/UL (ref 0–0.01)
NRBC BLD-RTO: 0 PER 100 WBC
PLATELET # BLD AUTO: 243 K/UL (ref 150–400)
PMV BLD AUTO: 11 FL (ref 8.9–12.9)
POTASSIUM SERPL-SCNC: 4.1 MMOL/L (ref 3.5–5.1)
PROT SERPL-MCNC: 8 G/DL (ref 6.4–8.2)
RBC # BLD AUTO: 5.9 M/UL (ref 4.1–5.7)
SODIUM SERPL-SCNC: 142 MMOL/L (ref 136–145)
VALPROATE SERPL-MCNC: 58 UG/ML (ref 50–100)
WBC # BLD AUTO: 6.4 K/UL (ref 4.1–11.1)

## 2023-10-17 ENCOUNTER — OFFICE VISIT (OUTPATIENT)
Age: 28
End: 2023-10-17
Payer: COMMERCIAL

## 2023-10-17 VITALS
SYSTOLIC BLOOD PRESSURE: 124 MMHG | HEIGHT: 73 IN | HEART RATE: 92 BPM | RESPIRATION RATE: 99 BRPM | WEIGHT: 202 LBS | DIASTOLIC BLOOD PRESSURE: 86 MMHG | BODY MASS INDEX: 26.77 KG/M2

## 2023-10-17 DIAGNOSIS — G40.309 GENERALIZED IDIOPATHIC EPILEPSY AND EPILEPTIC SYNDROMES, NOT INTRACTABLE, WITHOUT STATUS EPILEPTICUS (HCC): Primary | ICD-10-CM

## 2023-10-17 PROCEDURE — 99214 OFFICE O/P EST MOD 30 MIN: CPT | Performed by: PSYCHIATRY & NEUROLOGY

## 2023-10-17 RX ORDER — DIVALPROEX SODIUM 500 MG/1
500 TABLET, EXTENDED RELEASE ORAL 2 TIMES DAILY
Qty: 180 TABLET | Refills: 3 | Status: SHIPPED | OUTPATIENT
Start: 2023-10-17

## 2023-10-17 NOTE — PROGRESS NOTES
Chief Complaint   Patient presents with    Follow-up     Epilepsy     Vitals:    10/17/23 0956   BP: 124/86   Pulse: 92   Resp: (!) 99
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   No further seizures since April 2021  He is doing well on Depakote 500 mg twice daily and his last level was therapeutic  Tolerating it well and denies any side effects  Labs were reviewed and Depakote level was therapeutic  I will continue to refill medications    We again reviewed potential seizure triggers including sleep deprivation, excessive stress, substance use he should try to avoid those    Follow-up annually or earlier if needed    Carly Beckett MD  Diplomate, American Board of Psychiatry & Neurology (Neurology)  Diplomate, [address] [place] [address] of Psychiatry & Neurology (Clinical Neurophysiology)  Diplomate, American Board of Electrodiagnostic Medicine

## 2024-09-04 ENCOUNTER — OFFICE VISIT (OUTPATIENT)
Age: 29
End: 2024-09-04
Payer: COMMERCIAL

## 2024-09-04 VITALS
HEART RATE: 76 BPM | BODY MASS INDEX: 28.1 KG/M2 | HEIGHT: 73 IN | DIASTOLIC BLOOD PRESSURE: 84 MMHG | SYSTOLIC BLOOD PRESSURE: 128 MMHG | OXYGEN SATURATION: 98 % | WEIGHT: 212 LBS

## 2024-09-04 DIAGNOSIS — G40.309 GENERALIZED IDIOPATHIC EPILEPSY AND EPILEPTIC SYNDROMES, NOT INTRACTABLE, WITHOUT STATUS EPILEPTICUS (HCC): Primary | ICD-10-CM

## 2024-09-04 PROCEDURE — 99214 OFFICE O/P EST MOD 30 MIN: CPT | Performed by: PSYCHIATRY & NEUROLOGY

## 2024-09-04 RX ORDER — DIVALPROEX SODIUM 500 MG/1
500 TABLET, EXTENDED RELEASE ORAL 2 TIMES DAILY
Qty: 60 TABLET | Refills: 5 | Status: SHIPPED | OUTPATIENT
Start: 2024-09-04

## 2024-09-04 RX ORDER — OMEPRAZOLE 20 MG/1
20 TABLET, DELAYED RELEASE ORAL DAILY
COMMUNITY
Start: 2016-09-09

## 2024-09-04 NOTE — PROGRESS NOTES
Chief Complaint   Patient presents with    Follow-up     Epilepsy       HISTORY OF PRESENT ILLNESS  Lino Corbett came back for a follow-up.  He was last seen in October 2023  He is doing well.  No further seizures.  Tolerating Depakote well.  No side effects reported.  Last reported seizure was in April 2021.    His last EEG showed 3 to 4/s spike wave discharges hyperventilation indicating atypical absence epilepsy.  Denies any muscle twitches that he was experiencing before.  Labs were okay and level was therapeutic last year     He states that he has been noticing more GI upset/diarrhea and this has been a chronic problem for him.  He is wondering if the medication could be the reason.  However, his problems date back to his early college years.     RECAP  He was following up with pediatric neurology until recently.  He was experiencing  occasional myoclonic twitching in his extremities and his EEG showed generalized spike and wave pattern.  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  He was at home and abruptly went into a generalized convulsion and then had another one within a few minutes.  There was no tongue bite but he was experiencing pain in his dislocated shoulder and fracture of humerus bone that required internal fixation.  He was postictal when he arrived to the emergency room gradually cleared up.  As per ER records, he had used one THC gummy earlier in the day.  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   Currently working as a biomed tech.   There is no family history of seizures  He has been taking Depakote 500 mg twice daily for about 10 years and his most recent level was subtherapeutic.  He tells me that the level was therapeutic a few weeks prior.     Current Outpatient Medications

## 2024-09-04 NOTE — PROGRESS NOTES
Chief Complaint   Patient presents with    Follow-up     Epilepsy     Vitals:    09/04/24 0933   BP: 128/84   Pulse: 76   SpO2: 98%

## 2024-09-05 LAB
ALBUMIN SERPL-MCNC: 4.9 G/DL (ref 4.3–5.2)
ALP SERPL-CCNC: 88 IU/L (ref 44–121)
ALT SERPL-CCNC: 26 IU/L (ref 0–44)
AST SERPL-CCNC: 19 IU/L (ref 0–40)
BASOPHILS # BLD AUTO: 0 X10E3/UL (ref 0–0.2)
BASOPHILS NFR BLD AUTO: 1 %
BILIRUB SERPL-MCNC: 0.7 MG/DL (ref 0–1.2)
BUN SERPL-MCNC: 10 MG/DL (ref 6–20)
BUN/CREAT SERPL: 9 (ref 9–20)
CALCIUM SERPL-MCNC: 10 MG/DL (ref 8.7–10.2)
CHLORIDE SERPL-SCNC: 104 MMOL/L (ref 96–106)
CO2 SERPL-SCNC: 24 MMOL/L (ref 20–29)
CREAT SERPL-MCNC: 1.06 MG/DL (ref 0.76–1.27)
EGFRCR SERPLBLD CKD-EPI 2021: 99 ML/MIN/1.73
EOSINOPHIL # BLD AUTO: 0.1 X10E3/UL (ref 0–0.4)
EOSINOPHIL NFR BLD AUTO: 2 %
ERYTHROCYTE [DISTWIDTH] IN BLOOD BY AUTOMATED COUNT: 14.4 % (ref 11.6–15.4)
GLOBULIN SER CALC-MCNC: 2.9 G/DL (ref 1.5–4.5)
GLUCOSE SERPL-MCNC: 90 MG/DL (ref 70–99)
HCT VFR BLD AUTO: 48.8 % (ref 37.5–51)
HGB BLD-MCNC: 15.5 G/DL (ref 13–17.7)
IMM GRANULOCYTES # BLD AUTO: 0 X10E3/UL (ref 0–0.1)
IMM GRANULOCYTES NFR BLD AUTO: 0 %
LYMPHOCYTES # BLD AUTO: 1.2 X10E3/UL (ref 0.7–3.1)
LYMPHOCYTES NFR BLD AUTO: 31 %
MCH RBC QN AUTO: 25.5 PG (ref 26.6–33)
MCHC RBC AUTO-ENTMCNC: 31.8 G/DL (ref 31.5–35.7)
MCV RBC AUTO: 80 FL (ref 79–97)
MONOCYTES # BLD AUTO: 0.3 X10E3/UL (ref 0.1–0.9)
MONOCYTES NFR BLD AUTO: 9 %
NEUTROPHILS # BLD AUTO: 2.2 X10E3/UL (ref 1.4–7)
NEUTROPHILS NFR BLD AUTO: 57 %
PLATELET # BLD AUTO: 222 X10E3/UL (ref 150–450)
POTASSIUM SERPL-SCNC: 4.2 MMOL/L (ref 3.5–5.2)
PROT SERPL-MCNC: 7.8 G/DL (ref 6–8.5)
RBC # BLD AUTO: 6.07 X10E6/UL (ref 4.14–5.8)
SODIUM SERPL-SCNC: 142 MMOL/L (ref 134–144)
VALPROATE SERPL-MCNC: 77 UG/ML (ref 50–100)
WBC # BLD AUTO: 3.9 X10E3/UL (ref 3.4–10.8)

## 2025-02-25 ENCOUNTER — OFFICE VISIT (OUTPATIENT)
Age: 30
End: 2025-02-25
Payer: COMMERCIAL

## 2025-02-25 VITALS
SYSTOLIC BLOOD PRESSURE: 117 MMHG | HEIGHT: 73 IN | HEART RATE: 80 BPM | BODY MASS INDEX: 28.04 KG/M2 | RESPIRATION RATE: 16 BRPM | OXYGEN SATURATION: 97 % | DIASTOLIC BLOOD PRESSURE: 75 MMHG | WEIGHT: 211.6 LBS | TEMPERATURE: 98.6 F

## 2025-02-25 DIAGNOSIS — R59.9 ENLARGEMENT OF LYMPH NODE: Primary | ICD-10-CM

## 2025-02-25 PROCEDURE — 99213 OFFICE O/P EST LOW 20 MIN: CPT | Performed by: FAMILY MEDICINE

## 2025-02-25 RX ORDER — CLOBETASOL PROPIONATE 0.5 MG/G
OINTMENT TOPICAL 2 TIMES DAILY PRN
Qty: 15 G | Refills: 1 | Status: SHIPPED | OUTPATIENT
Start: 2025-02-25

## 2025-02-25 SDOH — ECONOMIC STABILITY: FOOD INSECURITY: WITHIN THE PAST 12 MONTHS, THE FOOD YOU BOUGHT JUST DIDN'T LAST AND YOU DIDN'T HAVE MONEY TO GET MORE.: NEVER TRUE

## 2025-02-25 SDOH — ECONOMIC STABILITY: FOOD INSECURITY: WITHIN THE PAST 12 MONTHS, YOU WORRIED THAT YOUR FOOD WOULD RUN OUT BEFORE YOU GOT MONEY TO BUY MORE.: NEVER TRUE

## 2025-02-25 ASSESSMENT — ENCOUNTER SYMPTOMS
CONSTIPATION: 0
BLOOD IN STOOL: 0
NAUSEA: 0
RHINORRHEA: 0
ABDOMINAL PAIN: 0
SHORTNESS OF BREATH: 0
EYE PAIN: 0
BACK PAIN: 0
CHEST TIGHTNESS: 0
VOMITING: 0
COUGH: 0

## 2025-02-25 ASSESSMENT — PATIENT HEALTH QUESTIONNAIRE - PHQ9
SUM OF ALL RESPONSES TO PHQ9 QUESTIONS 1 & 2: 0
SUM OF ALL RESPONSES TO PHQ QUESTIONS 1-9: 0
1. LITTLE INTEREST OR PLEASURE IN DOING THINGS: NOT AT ALL
2. FEELING DOWN, DEPRESSED OR HOPELESS: NOT AT ALL
SUM OF ALL RESPONSES TO PHQ QUESTIONS 1-9: 0

## 2025-02-25 NOTE — PROGRESS NOTES
Subjective:      Patient ID: Lino Corbett is a 29 y.o. male.    HPI  C/o knot in the right groin area x 5 days.  Had some slight tenderness initially.  Has gotten better and knot seems smaller today.  No sx of infection or pain in the scrotum.    No fever or chills noted.      Past Medical History:   Diagnosis Date    Epilepsy (HCC)     Seizures (HCC)      Past Surgical History:   Procedure Laterality Date    ORTHOPEDIC SURGERY  04/2021    right shoulder    SHOULDER SURGERY      UPPER GASTROINTESTINAL ENDOSCOPY       Current Outpatient Medications   Medication Sig Dispense Refill    clobetasol (TEMOVATE) 0.05 % ointment Apply topically 2 times daily as needed (rash) 15 g 1    omeprazole (PRILOSEC OTC) 20 MG tablet Take 1 tablet by mouth daily      divalproex (DEPAKOTE ER) 500 MG extended release tablet Take 1 tablet by mouth 2 times daily 60 tablet 5    KETOCONAZOLE, TOPICAL, (NIZORAL A-D) 1 % SHAM Use directed.       No current facility-administered medications for this visit.      Family History   Problem Relation Age of Onset    Cancer Paternal Grandfather         colon cancer    Hypertension Maternal Grandmother     Hypertension Paternal Grandmother     Cancer Paternal Uncle         pancreatic    Headache Mother     Hypertension Father     Cancer Father         liver and colon cancer in his 50s    Thyroid Disease Sister       Social History     Socioeconomic History    Marital status: Single     Spouse name: None    Number of children: None    Years of education: None    Highest education level: None   Tobacco Use    Smoking status: Some Days     Types: Cigars     Passive exposure: Past    Smokeless tobacco: Never   Vaping Use    Vaping status: Never Used    Passive vaping exposure: Yes   Substance and Sexual Activity    Alcohol use: No    Drug use: Yes     Types: Marijuana (Weed)    Sexual activity: Yes     Partners: Female     Birth control/protection: Condom     Social Determinants of Health     Financial

## 2025-02-25 NOTE — PROGRESS NOTES
Chief Complaint   Patient presents with    Groin Swelling     Patient states he feels a lump in his groin, has been present since 2025       \"Have you been to the ER, urgent care clinic since your last visit?  Hospitalized since your last visit?\"    YES, Urgent Care for vertigo    “Have you seen or consulted any other health care providers outside of [facility] since your last visit?”    NO          Click Here for Release of Records Request      Vitals:    25 0959   BP: 117/75   Pulse: 80   Resp: 16   Temp: 98.6 °F (37 °C)   SpO2: 97%       Health Maintenance Due   Topic Date Due    Depression Screen  Never done    Flu vaccine (1) 2024    COVID-19 Vaccine ( season) 2024        The patient, Lino Corbett, identity was verified by name and .

## 2025-02-26 LAB
BASOPHILS # BLD: 0.06 K/UL (ref 0–0.1)
BASOPHILS NFR BLD: 1 % (ref 0–1)
DIFFERENTIAL METHOD BLD: ABNORMAL
EOSINOPHIL # BLD: 0.06 K/UL (ref 0–0.4)
EOSINOPHIL NFR BLD: 1 % (ref 0–7)
ERYTHROCYTE [DISTWIDTH] IN BLOOD BY AUTOMATED COUNT: 14.6 % (ref 11.5–14.5)
HCT VFR BLD AUTO: 48.7 % (ref 36.6–50.3)
HGB BLD-MCNC: 15.4 G/DL (ref 12.1–17)
IMM GRANULOCYTES # BLD AUTO: 0 K/UL
IMM GRANULOCYTES NFR BLD AUTO: 0 %
LYMPHOCYTES # BLD: 3.07 K/UL (ref 0.8–3.5)
LYMPHOCYTES NFR BLD: 54 % (ref 12–49)
MCH RBC QN AUTO: 25.5 PG (ref 26–34)
MCHC RBC AUTO-ENTMCNC: 31.6 G/DL (ref 30–36.5)
MCV RBC AUTO: 80.6 FL (ref 80–99)
MONOCYTES # BLD: 1.03 K/UL (ref 0–1)
MONOCYTES NFR BLD: 18 % (ref 5–13)
NEUTS SEG # BLD: 1.48 K/UL (ref 1.8–8)
NEUTS SEG NFR BLD: 26 % (ref 32–75)
NRBC # BLD: 0 K/UL (ref 0–0.01)
NRBC BLD-RTO: 0 PER 100 WBC
PLATELET # BLD AUTO: 275 K/UL (ref 150–400)
PMV BLD AUTO: 9.9 FL (ref 8.9–12.9)
RBC # BLD AUTO: 6.04 M/UL (ref 4.1–5.7)
RBC MORPH BLD: ABNORMAL
WBC # BLD AUTO: 5.7 K/UL (ref 4.1–11.1)
WBC MORPH BLD: ABNORMAL

## 2025-03-03 ENCOUNTER — HOSPITAL ENCOUNTER (OUTPATIENT)
Facility: HOSPITAL | Age: 30
Discharge: HOME | End: 2025-03-06
Payer: COMMERCIAL

## 2025-03-03 DIAGNOSIS — R59.9 ENLARGEMENT OF LYMPH NODE: ICD-10-CM

## 2025-03-03 PROCEDURE — 76882 US LMTD JT/FCL EVL NVASC XTR: CPT

## 2025-03-13 ENCOUNTER — RESULTS FOLLOW-UP (OUTPATIENT)
Facility: HOSPITAL | Age: 30
End: 2025-03-13

## 2025-03-27 DIAGNOSIS — G40.309 GENERALIZED IDIOPATHIC EPILEPSY AND EPILEPTIC SYNDROMES, NOT INTRACTABLE, WITHOUT STATUS EPILEPTICUS (HCC): ICD-10-CM

## 2025-03-27 RX ORDER — DIVALPROEX SODIUM 500 MG/1
500 TABLET, FILM COATED, EXTENDED RELEASE ORAL 2 TIMES DAILY
Qty: 60 TABLET | Refills: 5 | Status: SHIPPED | OUTPATIENT
Start: 2025-03-27